# Patient Record
Sex: FEMALE | Race: WHITE | Employment: FULL TIME | URBAN - METROPOLITAN AREA
[De-identification: names, ages, dates, MRNs, and addresses within clinical notes are randomized per-mention and may not be internally consistent; named-entity substitution may affect disease eponyms.]

---

## 2018-12-12 ENCOUNTER — TRANSFERRED RECORDS (OUTPATIENT)
Dept: HEALTH INFORMATION MANAGEMENT | Facility: CLINIC | Age: 68
End: 2018-12-12

## 2018-12-13 ENCOUNTER — MEDICAL CORRESPONDENCE (OUTPATIENT)
Dept: HEALTH INFORMATION MANAGEMENT | Facility: CLINIC | Age: 68
End: 2018-12-13

## 2018-12-13 ENCOUNTER — TRANSFERRED RECORDS (OUTPATIENT)
Dept: HEALTH INFORMATION MANAGEMENT | Facility: CLINIC | Age: 68
End: 2018-12-13

## 2018-12-19 ENCOUNTER — TELEPHONE (OUTPATIENT)
Facility: CLINIC | Age: 68
End: 2018-12-19

## 2018-12-19 ENCOUNTER — OFFICE VISIT (OUTPATIENT)
Dept: NEUROSURGERY | Facility: CLINIC | Age: 68
End: 2018-12-19
Attending: NEUROLOGICAL SURGERY
Payer: COMMERCIAL

## 2018-12-19 VITALS
DIASTOLIC BLOOD PRESSURE: 85 MMHG | OXYGEN SATURATION: 95 % | HEART RATE: 78 BPM | SYSTOLIC BLOOD PRESSURE: 137 MMHG | HEIGHT: 62 IN | WEIGHT: 148 LBS | BODY MASS INDEX: 27.23 KG/M2 | TEMPERATURE: 98.8 F

## 2018-12-19 DIAGNOSIS — M47.12 CERVICAL SPONDYLOSIS WITH MYELOPATHY: Primary | ICD-10-CM

## 2018-12-19 DIAGNOSIS — G95.89 MYELOMALACIA (H): ICD-10-CM

## 2018-12-19 PROCEDURE — G0463 HOSPITAL OUTPT CLINIC VISIT: HCPCS

## 2018-12-19 PROCEDURE — 99204 OFFICE O/P NEW MOD 45 MIN: CPT | Performed by: NEUROLOGICAL SURGERY

## 2018-12-19 ASSESSMENT — MIFFLIN-ST. JEOR: SCORE: 1151.63

## 2018-12-19 ASSESSMENT — PAIN SCALES - GENERAL: PAINLEVEL: MODERATE PAIN (5)

## 2018-12-19 NOTE — NURSING NOTE
Pre-operative Education    Education included but not limited to:  - Pre-operative physical with primary care physician within 30 days of surgical date. Pre op [facility] clinic in [place] Dr Augustin Kam.    - Pre-operative clearance (cardiology, hematology, etc).   - Discontinue Aspirin, NSAIDs, Diclofenac x 7 days prior to surgical date.    -May try tylenol for pain 1000 mg three times per day for pain  -Do not begin taking Non-Steroidal Anti-Inflammatory Drugs or NSAIDs (Advil,Motrin, Ibuprofen,Nuprin, Diclofenac,Meloxicam, Aleve, Celebrex, Aspirin, etc.) until 12 weeks after surgery if you had a fusion. May cause bleeding and interfere with bone healing.  -Patient is not a smoker/  -Forms to be completed: FMLA/STD 6 weeks   -Surgical risks: blood clots in the leg or lung, problems urinating, nerve damage, drainage from the incision, infection,stiffness  -Preparation timeline  - When to start NPO           -Special bathing procedure.Patient received Hibiclens and showering instruction sheet.   Hospital stay:  Checking in  The surgery itself   Recovery room  - Transition to the hospital room where you will begin your recovery  - Managing pain   - 1 night hospitalization  - Post operative incisional pain x 1-2 weeks which will require pain medications and muscle relaxants.   -Do NOT drive or drink alcohol while taking narcotic pain medication.  -Post operative incision care-Keep your incision clean and dry at all times. OK to remove dressing on postop day 2. OK to shower on postop day 3 and allow water to run over incision, pat dry after shower. No bathing, swimming or submerging in water. Call immediately or come to ED if any drainage occurs, or you develop new pain. Watch for signs of infection: redness, swelling, warmth, drainage, and fever of 101 degrees or higher. Notify clinic.   - Post operative activity limitations: 6-8 weeks, no lifting > 10 pounds, no bending, twisting, or overhead reaching.  -Ok  to walk as tolerated, avoid bed rest and prolonged sitting.  -No contact sports until after follow up visit  -No high impact activities such as; running/jogging, snowmobile or 4 beckman riding or any other recreational vehicles.  -Orthotics will fit you for a brace in the hospital.Cervical fusion: wear soft collar for up to 2 weeks as needed for comfort.  - Post op follow up appointments:  6 weeks with Nurse Practitioner with X-ray prior. Please call to schedule follow up appointment at [phone].   - Spine Education book was also given to the patient for further review.      Patient verbalized understanding of above instructions. All questions were answered to the best of my ability and the patient's satisfaction. Patient advised to call with any additional questions or concerns.  Georgiana Tillman RN

## 2018-12-19 NOTE — NURSING NOTE
"Gay L Gerhardt is a 68 year old female who presents for:  Chief Complaint   Patient presents with     Neurologic Problem     Cervical myeopathy with cervical disc disease with myeopathy        Vitals:    Vitals:    12/19/18 1147   BP: 137/85   BP Location: Right arm   Patient Position: Sitting   Cuff Size: Adult Regular   Pulse: 78   Temp: 98.8  F (37.1  C)   TempSrc: Oral   SpO2: 95%   Weight: 148 lb (67.1 kg)   Height: 5' 1.5\" (1.562 m)       BMI:  Estimated body mass index is 27.51 kg/m  as calculated from the following:    Height as of this encounter: 5' 1.5\" (1.562 m).    Weight as of this encounter: 148 lb (67.1 kg).    Pain Score:  Moderate Pain (5)      Do you feel safe in your environment?  Yes      Leena Willis          "

## 2018-12-19 NOTE — PROGRESS NOTES
Neurosurgery Spine Consult [facility]      CC: Cervical myelopathy    Primary care Provider: System, Provider Not In    I was asked to see the patient by:  Leonie Argueta MD  [facility]  [address] [facility] [address]  [place] [address]      [place]: Gay L Gerhardt is a 68 year old female that presents to clinic with a complaint of numbness and tingling in the bilateral upper and bilateral lower extremities with difficulty walking.  The patient describes having progressive instability and weakness and difficulty with fine motor bilateral upper extremity numbness and tingling in bilateral lower extremity numbness and tingling.  She has not had therapy or injections and would not benefit from either.  She has difficulty with picking up small objects and ambulating and feels that this is progressively worsened over a period of time.  Her bowel and bladder function is intact.      Past Medical History:   Diagnosis Date     Anxiety state, unspecified      Depressive disorder, not elsewhere classified      Osteoporosis, unspecified      Postconcussion syndrome 2002    headaches - MCON       Past Surgical History:   Procedure Laterality Date     HYSTERECTOMY, ERICA      Hysterectomy,       Current Outpatient Medications   Medication     ALPRAZOLAM 0.5 MG OR TABS     ALPRAZOLAM 0.5 MG OR TABS     ASPIRIN 325 MG OR TBEC     BUSPIRONE HCL 5 MG OR TABS     BUSPIRONE HCL 5 MG OR TABS     CALCIUM 600 600 MG OR TABS     FOSAMAX 70 MG OR TABS     LIPITOR 10 MG OR TABS     LISINOPRIL 10 MG OR TABS     MULTIVITAMINS OR TABS     NAPROXEN 500 MG OR TABS     VITAMIN C 500 MG OR TABS     VITAMIN E 400 UNIT OR CAPS     No current facility-administered medications for this visit.        Allergies   Allergen Reactions     Paxil [Paroxetine] Fatigue     Pt had hallucinations and very negative, destructive feelings.        Social History     Socioeconomic History     Marital status:       Spouse name: None     Number of children: None     Years of education: None     Highest education level: None   Social Needs     Financial resource strain: None     Food insecurity - worry: None     Food insecurity - inability: None     Transportation needs - medical: None     Transportation needs - non-medical: None   Occupational History     None   Tobacco Use     Smoking status: Passive Smoke Exposure - Never Smoker     Smokeless tobacco: Never Used   Substance and Sexual Activity     Alcohol use: No     Drug use: No     Sexual activity: None   Other Topics Concern     Parent/sibling w/ CABG, MI or angioplasty before 65F 55M? Not Asked   Social History Narrative     None       Family History   Problem Relation Age of Onset     Thyroid Disease Mother         born 192     Cancer Father          40yo Leukemia     Family History Negative Sister      Family History Negative Sister      Alcohol/Drug Sister         alcoholic and smoker     Family History Negative Daughter      Family History Negative Son      Heart Disease Maternal Grandmother          59yo          Review Of Systems  Skin: negative  Eyes: negative  Ears/Nose/Throat: negative  Respiratory: No shortness of breath, dyspnea on exertion, cough, or hemoptysis  Cardiovascular: negative  Gastrointestinal: negative  Genitourinary: negative  Musculoskeletal: as above and neck pain  Neurologic: as above  Psychiatric: negative  Hematologic/Lymphatic/Immunologic: negative  Endocrine: negative    B/P: 137/85, T: 98.8, P: 78, R: Data Unavailable    Examination:  Normal affect and mood  No obvious labored respiration  No skin lesions noted   No obvious pitting edema  No abnormal psychiatric behavior  No obvious musculoskeletal abnormalities   Awake  Alert  Oriented x 3  Speech clear  Cranial nerves II - XII intact  Face symmetric  Tongue midline  Neck nontender  Normal ROM of neck  Motor exam    RUE - deltoid 5/5, biceps 4/5, triceps 4/5, wrist  extension 4/5, wrist flexion 4/5,  4+/5   LUE - deltoid 5/5, biceps 5/5, triceps 5/5, wrist extension 5/5, wrist flexion 5/5,  5/5     Sensation decreased in BUE and BLE  Clonus 2-3 beats  DTR 3+ throughout the patella tendon  Smith's sign positive  Spurling's sign positive  Ambulation slightly unstable    Imaging:   MRI cervical spine reveals C4-5 moderately severe stenosis, C5-6 severe stenosis and C6-7 various and severe stenosis with cord compression and myelomalacia        Diagnosis:  C8-year-old female with severe cervical stenosis from C4-7 with myelopathy and myelomalacia      Assessment/Plan:   I recommended we proceed with a C4-7 anterior cervical discectomy and fusion the first week of January.I discussed with the patient the risk of surgery to include, but, not be limited to; dysphagia, hoarseness, paralyzed vocal cord, nerve/spinal cord injury, pseudoarthrosis, failure of hardware, failure of improvement of symptoms, CSF leak,  infection, post op hematoma, the need for recurrent surgery, paralysis, coma and death        Adalid Bagley MD, MS, FAANS  Neurosurgeon  [facility]  [facility]  [address], [address]  [place] [address]  [phone]

## 2018-12-19 NOTE — LETTER
[facility]   [facility]  [address] [place] [address]        December 19, 2018          To Whom it May Concern,      Gay L Gerhardt was seen in clinic today 12/19/18. She will be scheduled for a cervical spinal fusion. She will need to be off of work for 6 weeks following her surgery for post op recovery and healing.         Sincerely,            Adalid Bagley MD  [facility]  [address] [address]    Tel [phone]

## 2018-12-19 NOTE — PATIENT INSTRUCTIONS
- Work letter given.    Patient Instructions  Pre-Operative:  -Surgery scheduled at [facility] for C4-7 ACDF (anterior cervical discectomy and fusion)  -Surgical risks: blood clots in the leg or lung, problems urinating, nerve damage, drainage from the incision, infection,stiffness  - Pre-operative physical with primary care physician within 30 days of surgical date.   -1 night hospitalization.    -Shower procedure: please shower with antimicrobial soap the night before surgery and morning of surgery. Please refer to showering instruction sheet in folder.  -Stop all solid foods 8 hours before surgery.  -Keep drinking clear liquids until 4 hours before surgery. Clear liquids include water, clear juice, black coffee, or clear tea without milk, Gatorade, clear soda.   - Discontinue Aspirin, NSAIDs (Advil/Ibuprofen, Naproxen,Nuprin,Relafen/Nabumetone, Diclofenac,Meloxicam, Aleve, Celebrex) x 7 days prior to surgical date.    - May try tylenol for pain 1000 mg three times per day for pain        Post-Operative:  -Do not begin taking Non-Steroidal Anti-Inflammatory Drugs or NSAIDs (Advil/ibuprofen, Naproxen,Nuprin, Relafen/Nabumetone, Diclofenac,Meloxicam, Aleve, Celebrex, Aspirin, etc.) until 12 weeks after surgery. May cause bleeding and interfere with bone healing.   - Post operative incisional pain x 1-2 weeks which will require pain medications and muscle relaxants. You will receive medication upon discharge.  -Do NOT drive while taking narcotic pain medication.  -Do not drink alcohol while using any pain medication   -Post operative incision care- Watch for signs of infection: redness, swelling, warmth, drainage, and fever of 101 degrees or higher. [facility] [phone].  -No submerging incision in water such as pools, hot tubs,baths for at least 8 weeks or until incision is healed. Showers are fine.   - Post operative activity limitations: 6-8 weeks, no lifting > 10 pounds, no bending, twisting,  or overhead reaching.  -Ok to walk as tolerated, avoid bed rest and prolonged sitting.  -No contact sports until after follow up visit  -No high impact activities such as; running/jogging, snowmobile or 4 beckman riding or any other recreational vehicles.  -Orthotics will fit you for a brace in the hospital.Cervical fusion: wear soft collar for up to 2 weeks as needed for comfort.  - Post op follow up appointments: 6 week post op follow up visit with Nurse practitioner and x-ray prior to appointment.Please call to schedule follow up appointment at [phone].  -If you are currently employed, you will need to be off work for 4-6 weeks for post op recovery and healing.

## 2018-12-20 RX ORDER — ESCITALOPRAM OXALATE 10 MG/1
10 TABLET ORAL AT BEDTIME
COMMUNITY

## 2018-12-20 RX ORDER — PRAMIPEXOLE DIHYDROCHLORIDE 0.25 MG/1
0.25 TABLET ORAL AT BEDTIME
COMMUNITY

## 2018-12-20 RX ORDER — SIMVASTATIN 20 MG
20 TABLET ORAL AT BEDTIME
COMMUNITY

## 2018-12-21 ENCOUNTER — ANESTHESIA EVENT (OUTPATIENT)
Dept: SURGERY | Facility: CLINIC | Age: 68
DRG: 472 | End: 2018-12-21
Payer: COMMERCIAL

## 2018-12-26 ENCOUNTER — DOCUMENTATION ONLY (OUTPATIENT)
Dept: NEUROSURGERY | Facility: CLINIC | Age: 68
End: 2018-12-26

## 2018-12-26 NOTE — PROGRESS NOTES
Patient called today and said that she is having a lot of pain which is interfering with her job.     We will take her off of work from 12/24/18 through her surgery date 1/4/19 then for 6 weeks after for post op healing and recovery.     Work letter faxed to ATTN: Atiya Christine [phone] on 12/26/18.

## 2018-12-26 NOTE — LETTER
[facility]   [facility]  [address] [place] [address]        December 26, 2018          To Whom it May Concern,    Please excuse Gay L Gerhardt from work 12/24/2018 through date of surgery 1/4/2019, and then for 6 weeks following surgery for post op recovery and healing.             Sincerely,            Adalid Bagley MD  [facility]  [address] [place] [address]    Tel [phone]

## 2018-12-30 NOTE — PHARMACY-ADMISSION MEDICATION HISTORY
Medication reconciliation interview completed by pre-admitting nurse Shawna Islas, reviewed by pharmacy. No further clarifications needed.       Prior to Admission medications    Medication Sig Last Dose Taking? Auth Provider   cholecalciferol (D3 HIGH POTENCY) 2000 units CAPS Take 4,000 Units by mouth daily  Yes Reported, Patient   escitalopram (LEXAPRO) 10 MG tablet Take 10 mg by mouth At Bedtime  Yes Reported, Patient   FOSAMAX 70 MG OR TABS ONE TABLET WEEKLY  Yes Bridgett Delgado MD   NONFORMULARY Take 1,000 mg by mouth daily Calcium  Yes Reported, Patient   pramipexole (MIRAPEX) 0.25 MG tablet Take 0.25 mg by mouth At Bedtime  Yes Reported, Patient   simvastatin (ZOCOR) 20 MG tablet Take 20 mg by mouth At Bedtime  Yes Reported, Patient   VITAMIN E 400 UNIT OR CAPS ONE CAPSULE DAILY  Yes Bridgett Delgado MD   ASPIRIN 325 MG OR TBEC ONE DAILY   Bridgett Delgado MD   MULTIVITAMINS OR TABS ONE DAILY   Bridgett Delgado MD            Please follow up with Dr. Nieves in 1 week  [phone]        SPECIAL CAUTION FOR GROIN PUNCTURE:    1. No driving, bending over at the waist, or bearing down for 48 hours.    2. No lifting over 5 lbs for 1 week.    3. No bath or swimming pools for 1 week, you may shower. No lotions or powders    4. If Bleeding occurs, lie down immediately and apply pressure to the site with your fingers. Have Someone call you physician immediately. Call 911 if bleeding does not stop.    5. If you have any questions or problems of medical nature your should call your physician.    6. Call for fever >100.4, abnormal bruising or drainage from the site.    7. You may remove the dressing in the morning, no need to apply a bandage.

## 2019-01-02 ENCOUNTER — DOCUMENTATION ONLY (OUTPATIENT)
Dept: NEUROSURGERY | Facility: CLINIC | Age: 69
End: 2019-01-02

## 2019-01-02 NOTE — PROGRESS NOTES
1/2/2019    [employer] Forms: yes    Faxed to: [phone]     Type of form: Certification of health care provider for employees serious health condition (family and medical leave act)    Placed a copy in the bin and sent the original to medical records       Principal Discharge DX:	Jacques catheter problem, subsequent encounter

## 2019-01-04 ENCOUNTER — HOSPITAL ENCOUNTER (INPATIENT)
Facility: CLINIC | Age: 69
LOS: 1 days | Discharge: HOME | DRG: 472 | End: 2019-01-05
Attending: NEUROLOGICAL SURGERY | Admitting: NEUROLOGICAL SURGERY
Payer: COMMERCIAL

## 2019-01-04 ENCOUNTER — APPOINTMENT (OUTPATIENT)
Dept: GENERAL RADIOLOGY | Facility: CLINIC | Age: 69
DRG: 472 | End: 2019-01-04
Attending: NEUROLOGICAL SURGERY
Payer: COMMERCIAL

## 2019-01-04 ENCOUNTER — ANESTHESIA (OUTPATIENT)
Dept: SURGERY | Facility: CLINIC | Age: 69
DRG: 472 | End: 2019-01-04
Payer: COMMERCIAL

## 2019-01-04 DIAGNOSIS — Z98.1 S/P CERVICAL SPINAL FUSION: ICD-10-CM

## 2019-01-04 LAB
ABO + RH BLD: NORMAL
ABO + RH BLD: NORMAL
BLD GP AB SCN SERPL QL: NORMAL
BLOOD BANK CMNT PATIENT-IMP: NORMAL
GLUCOSE BLDC GLUCOMTR-MCNC: 141 MG/DL (ref 70–99)
HGB BLD-MCNC: 12.6 G/DL (ref 11.7–15.7)
SPECIMEN EXP DATE BLD: NORMAL

## 2019-01-04 PROCEDURE — 40000277 XR SURGERY CARM FLUORO LESS THAN 5 MIN W STILLS: Mod: TC

## 2019-01-04 PROCEDURE — 85018 HEMOGLOBIN: CPT | Performed by: NEUROLOGICAL SURGERY

## 2019-01-04 PROCEDURE — 37000009 ZZH ANESTHESIA TECHNICAL FEE, EACH ADDTL 15 MIN: Performed by: NEUROLOGICAL SURGERY

## 2019-01-04 PROCEDURE — 36000071 ZZH SURGERY LEVEL 5 W FLUORO 1ST 30 MIN: Performed by: NEUROLOGICAL SURGERY

## 2019-01-04 PROCEDURE — 25000125 ZZHC RX 250: Performed by: NURSE ANESTHETIST, CERTIFIED REGISTERED

## 2019-01-04 PROCEDURE — 86850 RBC ANTIBODY SCREEN: CPT | Performed by: NEUROLOGICAL SURGERY

## 2019-01-04 PROCEDURE — C1762 CONN TISS, HUMAN(INC FASCIA): HCPCS | Performed by: NEUROLOGICAL SURGERY

## 2019-01-04 PROCEDURE — 25000128 H RX IP 250 OP 636: Performed by: NEUROLOGICAL SURGERY

## 2019-01-04 PROCEDURE — 25000128 H RX IP 250 OP 636: Performed by: ANESTHESIOLOGY

## 2019-01-04 PROCEDURE — 25000125 ZZHC RX 250: Performed by: NEUROLOGICAL SURGERY

## 2019-01-04 PROCEDURE — 00000146 ZZHCL STATISTIC GLUCOSE BY METER IP

## 2019-01-04 PROCEDURE — 0RB30ZZ EXCISION OF CERVICAL VERTEBRAL DISC, OPEN APPROACH: ICD-10-PCS | Performed by: NEUROLOGICAL SURGERY

## 2019-01-04 PROCEDURE — 4A11X4G MONITORING OF PERIPHERAL NERVOUS ELECTRICAL ACTIVITY, INTRAOPERATIVE, EXTERNAL APPROACH: ICD-10-PCS | Performed by: NEUROLOGICAL SURGERY

## 2019-01-04 PROCEDURE — 12000000 ZZH R&B MED SURG/OB

## 2019-01-04 PROCEDURE — 22551 ARTHRD ANT NTRBDY CERVICAL: CPT | Performed by: NEUROLOGICAL SURGERY

## 2019-01-04 PROCEDURE — 0RG20A0 FUSION OF 2 OR MORE CERVICAL VERTEBRAL JOINTS WITH INTERBODY FUSION DEVICE, ANTERIOR APPROACH, ANTERIOR COLUMN, OPEN APPROACH: ICD-10-PCS | Performed by: NEUROLOGICAL SURGERY

## 2019-01-04 PROCEDURE — 22552 ARTHRD ANT NTRBD CERVICAL EA: CPT | Performed by: NEUROLOGICAL SURGERY

## 2019-01-04 PROCEDURE — 25000132 ZZH RX MED GY IP 250 OP 250 PS 637: Performed by: NEUROLOGICAL SURGERY

## 2019-01-04 PROCEDURE — 22846 INSERT SPINE FIXATION DEVICE: CPT | Mod: 59 | Performed by: NEUROLOGICAL SURGERY

## 2019-01-04 PROCEDURE — 86901 BLOOD TYPING SEROLOGIC RH(D): CPT | Performed by: NEUROLOGICAL SURGERY

## 2019-01-04 PROCEDURE — G0378 HOSPITAL OBSERVATION PER HR: HCPCS

## 2019-01-04 PROCEDURE — 27211022 ZZHC OR IOM SUPPLIES OPNP: Performed by: NEUROLOGICAL SURGERY

## 2019-01-04 PROCEDURE — 95940 IONM IN OPERATNG ROOM 15 MIN: CPT | Performed by: NEUROLOGICAL SURGERY

## 2019-01-04 PROCEDURE — 27210794 ZZH OR GENERAL SUPPLY STERILE: Performed by: NEUROLOGICAL SURGERY

## 2019-01-04 PROCEDURE — 00NW0ZZ RELEASE CERVICAL SPINAL CORD, OPEN APPROACH: ICD-10-PCS | Performed by: NEUROLOGICAL SURGERY

## 2019-01-04 PROCEDURE — 22853 INSJ BIOMECHANICAL DEVICE: CPT | Performed by: NEUROLOGICAL SURGERY

## 2019-01-04 PROCEDURE — 86900 BLOOD TYPING SEROLOGIC ABO: CPT | Performed by: NEUROLOGICAL SURGERY

## 2019-01-04 PROCEDURE — 36415 COLL VENOUS BLD VENIPUNCTURE: CPT | Performed by: NEUROLOGICAL SURGERY

## 2019-01-04 PROCEDURE — 36000069 ZZH SURGERY LEVEL 5 EA 15 ADDTL MIN: Performed by: NEUROLOGICAL SURGERY

## 2019-01-04 PROCEDURE — 40000306 ZZH STATISTIC PRE PROC ASSESS II: Performed by: NEUROLOGICAL SURGERY

## 2019-01-04 PROCEDURE — 71000012 ZZH RECOVERY PHASE 1 LEVEL 1 FIRST HR: Performed by: NEUROLOGICAL SURGERY

## 2019-01-04 PROCEDURE — 25000128 H RX IP 250 OP 636: Performed by: NURSE ANESTHETIST, CERTIFIED REGISTERED

## 2019-01-04 PROCEDURE — 37000008 ZZH ANESTHESIA TECHNICAL FEE, 1ST 30 MIN: Performed by: NEUROLOGICAL SURGERY

## 2019-01-04 PROCEDURE — 71000013 ZZH RECOVERY PHASE 1 LEVEL 1 EA ADDTL HR: Performed by: NEUROLOGICAL SURGERY

## 2019-01-04 PROCEDURE — 25000300 ZZH OR RX SURGIFLO HEMOSTATIC MATRIX 10ML 199102S OPNP: Performed by: NEUROLOGICAL SURGERY

## 2019-01-04 DEVICE — IMPLANTABLE DEVICE: Type: IMPLANTABLE DEVICE | Site: SPINE CERVICAL | Status: FUNCTIONAL

## 2019-01-04 DEVICE — GRAFT BONE PUTTY DBX 01ML 038010: Type: IMPLANTABLE DEVICE | Site: SPINE CERVICAL | Status: FUNCTIONAL

## 2019-01-04 RX ORDER — KETAMINE HYDROCHLORIDE 10 MG/ML
INJECTION INTRAMUSCULAR; INTRAVENOUS PRN
Status: DISCONTINUED | OUTPATIENT
Start: 2019-01-04 | End: 2019-01-04

## 2019-01-04 RX ORDER — METOCLOPRAMIDE HYDROCHLORIDE 5 MG/ML
5 INJECTION INTRAMUSCULAR; INTRAVENOUS EVERY 6 HOURS PRN
Status: DISCONTINUED | OUTPATIENT
Start: 2019-01-04 | End: 2019-01-05 | Stop reason: HOSPADM

## 2019-01-04 RX ORDER — FENTANYL CITRATE 50 UG/ML
25-50 INJECTION, SOLUTION INTRAMUSCULAR; INTRAVENOUS
Status: DISCONTINUED | OUTPATIENT
Start: 2019-01-04 | End: 2019-01-04 | Stop reason: HOSPADM

## 2019-01-04 RX ORDER — SODIUM CHLORIDE AND POTASSIUM CHLORIDE 150; 900 MG/100ML; MG/100ML
INJECTION, SOLUTION INTRAVENOUS CONTINUOUS
Status: DISCONTINUED | OUTPATIENT
Start: 2019-01-04 | End: 2019-01-05 | Stop reason: HOSPADM

## 2019-01-04 RX ORDER — ONDANSETRON 2 MG/ML
4 INJECTION INTRAMUSCULAR; INTRAVENOUS EVERY 6 HOURS PRN
Status: DISCONTINUED | OUTPATIENT
Start: 2019-01-04 | End: 2019-01-05 | Stop reason: HOSPADM

## 2019-01-04 RX ORDER — DEXAMETHASONE SODIUM PHOSPHATE 4 MG/ML
INJECTION, SOLUTION INTRA-ARTICULAR; INTRALESIONAL; INTRAMUSCULAR; INTRAVENOUS; SOFT TISSUE PRN
Status: DISCONTINUED | OUTPATIENT
Start: 2019-01-04 | End: 2019-01-04

## 2019-01-04 RX ORDER — ACETAMINOPHEN 325 MG/1
975 TABLET ORAL EVERY 8 HOURS
Status: DISCONTINUED | OUTPATIENT
Start: 2019-01-04 | End: 2019-01-05 | Stop reason: HOSPADM

## 2019-01-04 RX ORDER — CEFAZOLIN SODIUM 1 G/3ML
1 INJECTION, POWDER, FOR SOLUTION INTRAMUSCULAR; INTRAVENOUS SEE ADMIN INSTRUCTIONS
Status: DISCONTINUED | OUTPATIENT
Start: 2019-01-04 | End: 2019-01-04 | Stop reason: HOSPADM

## 2019-01-04 RX ORDER — NALOXONE HYDROCHLORIDE 0.4 MG/ML
.1-.4 INJECTION, SOLUTION INTRAMUSCULAR; INTRAVENOUS; SUBCUTANEOUS
Status: DISCONTINUED | OUTPATIENT
Start: 2019-01-04 | End: 2019-01-05 | Stop reason: HOSPADM

## 2019-01-04 RX ORDER — PRAMIPEXOLE DIHYDROCHLORIDE 0.12 MG/1
0.25 TABLET ORAL AT BEDTIME
Status: DISCONTINUED | OUTPATIENT
Start: 2019-01-04 | End: 2019-01-05 | Stop reason: HOSPADM

## 2019-01-04 RX ORDER — DEXAMETHASONE SODIUM PHOSPHATE 4 MG/ML
4 INJECTION, SOLUTION INTRA-ARTICULAR; INTRALESIONAL; INTRAMUSCULAR; INTRAVENOUS; SOFT TISSUE EVERY 6 HOURS
Status: COMPLETED | OUTPATIENT
Start: 2019-01-04 | End: 2019-01-05

## 2019-01-04 RX ORDER — METOCLOPRAMIDE 5 MG/1
5 TABLET ORAL EVERY 6 HOURS PRN
Status: DISCONTINUED | OUTPATIENT
Start: 2019-01-04 | End: 2019-01-05 | Stop reason: HOSPADM

## 2019-01-04 RX ORDER — METHOCARBAMOL 750 MG/1
750 TABLET, FILM COATED ORAL EVERY 6 HOURS PRN
Status: DISCONTINUED | OUTPATIENT
Start: 2019-01-04 | End: 2019-01-05 | Stop reason: HOSPADM

## 2019-01-04 RX ORDER — ACETAMINOPHEN 325 MG/1
975 TABLET ORAL EVERY 8 HOURS
Status: DISCONTINUED | OUTPATIENT
Start: 2019-01-04 | End: 2019-01-04

## 2019-01-04 RX ORDER — LIDOCAINE HYDROCHLORIDE 10 MG/ML
INJECTION, SOLUTION INFILTRATION; PERINEURAL PRN
Status: DISCONTINUED | OUTPATIENT
Start: 2019-01-04 | End: 2019-01-04

## 2019-01-04 RX ORDER — SODIUM CHLORIDE, SODIUM LACTATE, POTASSIUM CHLORIDE, CALCIUM CHLORIDE 600; 310; 30; 20 MG/100ML; MG/100ML; MG/100ML; MG/100ML
INJECTION, SOLUTION INTRAVENOUS CONTINUOUS
Status: DISCONTINUED | OUTPATIENT
Start: 2019-01-04 | End: 2019-01-04 | Stop reason: HOSPADM

## 2019-01-04 RX ORDER — ONDANSETRON 4 MG/1
4 TABLET, ORALLY DISINTEGRATING ORAL EVERY 6 HOURS PRN
Status: DISCONTINUED | OUTPATIENT
Start: 2019-01-04 | End: 2019-01-05 | Stop reason: HOSPADM

## 2019-01-04 RX ORDER — SIMVASTATIN 20 MG
20 TABLET ORAL AT BEDTIME
Status: DISCONTINUED | OUTPATIENT
Start: 2019-01-04 | End: 2019-01-05 | Stop reason: HOSPADM

## 2019-01-04 RX ORDER — PROCHLORPERAZINE MALEATE 5 MG
5 TABLET ORAL EVERY 6 HOURS PRN
Status: DISCONTINUED | OUTPATIENT
Start: 2019-01-04 | End: 2019-01-05 | Stop reason: HOSPADM

## 2019-01-04 RX ORDER — EPHEDRINE SULFATE 50 MG/ML
INJECTION, SOLUTION INTRAMUSCULAR; INTRAVENOUS; SUBCUTANEOUS PRN
Status: DISCONTINUED | OUTPATIENT
Start: 2019-01-04 | End: 2019-01-04

## 2019-01-04 RX ORDER — BUPIVACAINE HYDROCHLORIDE AND EPINEPHRINE 5; 5 MG/ML; UG/ML
INJECTION, SOLUTION PERINEURAL PRN
Status: DISCONTINUED | OUTPATIENT
Start: 2019-01-04 | End: 2019-01-04 | Stop reason: HOSPADM

## 2019-01-04 RX ORDER — FENTANYL CITRATE 50 UG/ML
INJECTION, SOLUTION INTRAMUSCULAR; INTRAVENOUS PRN
Status: DISCONTINUED | OUTPATIENT
Start: 2019-01-04 | End: 2019-01-04

## 2019-01-04 RX ORDER — CEFAZOLIN SODIUM 2 G/100ML
2 INJECTION, SOLUTION INTRAVENOUS EVERY 8 HOURS
Status: DISCONTINUED | OUTPATIENT
Start: 2019-01-04 | End: 2019-01-05 | Stop reason: HOSPADM

## 2019-01-04 RX ORDER — HYDROMORPHONE HYDROCHLORIDE 1 MG/ML
.3-.5 INJECTION, SOLUTION INTRAMUSCULAR; INTRAVENOUS; SUBCUTANEOUS EVERY 5 MIN PRN
Status: DISCONTINUED | OUTPATIENT
Start: 2019-01-04 | End: 2019-01-04 | Stop reason: HOSPADM

## 2019-01-04 RX ORDER — LIDOCAINE 40 MG/G
CREAM TOPICAL
Status: DISCONTINUED | OUTPATIENT
Start: 2019-01-04 | End: 2019-01-04 | Stop reason: HOSPADM

## 2019-01-04 RX ORDER — DIPHENHYDRAMINE HCL 12.5MG/5ML
12.5 LIQUID (ML) ORAL EVERY 6 HOURS PRN
Status: DISCONTINUED | OUTPATIENT
Start: 2019-01-04 | End: 2019-01-05 | Stop reason: HOSPADM

## 2019-01-04 RX ORDER — DOCUSATE SODIUM 100 MG/1
100 CAPSULE, LIQUID FILLED ORAL 2 TIMES DAILY
Status: DISCONTINUED | OUTPATIENT
Start: 2019-01-04 | End: 2019-01-05 | Stop reason: HOSPADM

## 2019-01-04 RX ORDER — ONDANSETRON 2 MG/ML
INJECTION INTRAMUSCULAR; INTRAVENOUS PRN
Status: DISCONTINUED | OUTPATIENT
Start: 2019-01-04 | End: 2019-01-04

## 2019-01-04 RX ORDER — ACETAMINOPHEN 325 MG/1
650 TABLET ORAL EVERY 4 HOURS PRN
Status: DISCONTINUED | OUTPATIENT
Start: 2019-01-07 | End: 2019-01-05 | Stop reason: HOSPADM

## 2019-01-04 RX ORDER — HYDROMORPHONE HYDROCHLORIDE 2 MG/1
2-4 TABLET ORAL
Status: DISCONTINUED | OUTPATIENT
Start: 2019-01-04 | End: 2019-01-04

## 2019-01-04 RX ORDER — CEFAZOLIN SODIUM 2 G/100ML
2 INJECTION, SOLUTION INTRAVENOUS
Status: COMPLETED | OUTPATIENT
Start: 2019-01-04 | End: 2019-01-04

## 2019-01-04 RX ORDER — ESCITALOPRAM OXALATE 10 MG/1
10 TABLET ORAL AT BEDTIME
Status: DISCONTINUED | OUTPATIENT
Start: 2019-01-04 | End: 2019-01-05 | Stop reason: HOSPADM

## 2019-01-04 RX ORDER — PROPOFOL 10 MG/ML
INJECTION, EMULSION INTRAVENOUS PRN
Status: DISCONTINUED | OUTPATIENT
Start: 2019-01-04 | End: 2019-01-04

## 2019-01-04 RX ORDER — GLYCOPYRROLATE 0.2 MG/ML
INJECTION, SOLUTION INTRAMUSCULAR; INTRAVENOUS PRN
Status: DISCONTINUED | OUTPATIENT
Start: 2019-01-04 | End: 2019-01-04

## 2019-01-04 RX ORDER — ONDANSETRON 4 MG/1
4 TABLET, ORALLY DISINTEGRATING ORAL EVERY 30 MIN PRN
Status: DISCONTINUED | OUTPATIENT
Start: 2019-01-04 | End: 2019-01-04 | Stop reason: HOSPADM

## 2019-01-04 RX ORDER — HYDROMORPHONE HYDROCHLORIDE 1 MG/ML
.3-.5 INJECTION, SOLUTION INTRAMUSCULAR; INTRAVENOUS; SUBCUTANEOUS
Status: DISCONTINUED | OUTPATIENT
Start: 2019-01-04 | End: 2019-01-05 | Stop reason: HOSPADM

## 2019-01-04 RX ORDER — OXYCODONE HYDROCHLORIDE 5 MG/1
5-10 TABLET ORAL EVERY 4 HOURS PRN
Status: DISCONTINUED | OUTPATIENT
Start: 2019-01-04 | End: 2019-01-05 | Stop reason: HOSPADM

## 2019-01-04 RX ORDER — NALOXONE HYDROCHLORIDE 0.4 MG/ML
.1-.4 INJECTION, SOLUTION INTRAMUSCULAR; INTRAVENOUS; SUBCUTANEOUS
Status: DISCONTINUED | OUTPATIENT
Start: 2019-01-04 | End: 2019-01-04

## 2019-01-04 RX ORDER — LIDOCAINE 40 MG/G
CREAM TOPICAL
Status: DISCONTINUED | OUTPATIENT
Start: 2019-01-04 | End: 2019-01-05 | Stop reason: HOSPADM

## 2019-01-04 RX ORDER — ONDANSETRON 2 MG/ML
4 INJECTION INTRAMUSCULAR; INTRAVENOUS EVERY 30 MIN PRN
Status: DISCONTINUED | OUTPATIENT
Start: 2019-01-04 | End: 2019-01-04 | Stop reason: HOSPADM

## 2019-01-04 RX ORDER — DIPHENHYDRAMINE HYDROCHLORIDE 50 MG/ML
12.5 INJECTION INTRAMUSCULAR; INTRAVENOUS EVERY 6 HOURS PRN
Status: DISCONTINUED | OUTPATIENT
Start: 2019-01-04 | End: 2019-01-05 | Stop reason: HOSPADM

## 2019-01-04 RX ORDER — ACETAMINOPHEN 325 MG/1
650 TABLET ORAL EVERY 4 HOURS PRN
Status: DISCONTINUED | OUTPATIENT
Start: 2019-01-07 | End: 2019-01-04

## 2019-01-04 RX ADMIN — PHENYLEPHRINE HYDROCHLORIDE 100 MCG: 10 INJECTION, SOLUTION INTRAMUSCULAR; INTRAVENOUS; SUBCUTANEOUS at 12:08

## 2019-01-04 RX ADMIN — PRAMIPEXOLE DIHYDROCHLORIDE 0.25 MG: 0.12 TABLET ORAL at 21:36

## 2019-01-04 RX ADMIN — ACETAMINOPHEN 975 MG: 325 TABLET, FILM COATED ORAL at 18:25

## 2019-01-04 RX ADMIN — LIDOCAINE HYDROCHLORIDE 30 MG: 10 INJECTION, SOLUTION INFILTRATION; PERINEURAL at 11:41

## 2019-01-04 RX ADMIN — FENTANYL CITRATE 50 MCG: 50 INJECTION, SOLUTION INTRAMUSCULAR; INTRAVENOUS at 15:01

## 2019-01-04 RX ADMIN — SODIUM CHLORIDE, POTASSIUM CHLORIDE, SODIUM LACTATE AND CALCIUM CHLORIDE: 600; 310; 30; 20 INJECTION, SOLUTION INTRAVENOUS at 11:04

## 2019-01-04 RX ADMIN — Medication 50 MG: at 11:56

## 2019-01-04 RX ADMIN — DEXAMETHASONE SODIUM PHOSPHATE 4 MG: 4 INJECTION, SOLUTION INTRAMUSCULAR; INTRAVENOUS at 18:25

## 2019-01-04 RX ADMIN — Medication 0.5 MG: at 19:42

## 2019-01-04 RX ADMIN — DEXMEDETOMIDINE HYDROCHLORIDE 20 MCG: 100 INJECTION, SOLUTION INTRAVENOUS at 12:15

## 2019-01-04 RX ADMIN — SODIUM CHLORIDE, POTASSIUM CHLORIDE, SODIUM LACTATE AND CALCIUM CHLORIDE: 600; 310; 30; 20 INJECTION, SOLUTION INTRAVENOUS at 12:00

## 2019-01-04 RX ADMIN — FENTANYL CITRATE 50 MCG: 50 INJECTION, SOLUTION INTRAMUSCULAR; INTRAVENOUS at 12:23

## 2019-01-04 RX ADMIN — POTASSIUM CHLORIDE AND SODIUM CHLORIDE: 900; 150 INJECTION, SOLUTION INTRAVENOUS at 18:25

## 2019-01-04 RX ADMIN — HYDROMORPHONE HYDROCHLORIDE 0.5 MG: 1 INJECTION, SOLUTION INTRAMUSCULAR; INTRAVENOUS; SUBCUTANEOUS at 12:32

## 2019-01-04 RX ADMIN — OXYCODONE HYDROCHLORIDE 10 MG: 5 TABLET ORAL at 22:50

## 2019-01-04 RX ADMIN — DEXAMETHASONE SODIUM PHOSPHATE 8 MG: 4 INJECTION, SOLUTION INTRA-ARTICULAR; INTRALESIONAL; INTRAMUSCULAR; INTRAVENOUS; SOFT TISSUE at 11:41

## 2019-01-04 RX ADMIN — CEFAZOLIN SODIUM 2 G: 2 INJECTION, SOLUTION INTRAVENOUS at 11:33

## 2019-01-04 RX ADMIN — ROCURONIUM BROMIDE 25 MG: 10 INJECTION INTRAVENOUS at 11:41

## 2019-01-04 RX ADMIN — PROPOFOL 140 MG: 10 INJECTION, EMULSION INTRAVENOUS at 11:41

## 2019-01-04 RX ADMIN — ONDANSETRON 4 MG: 2 INJECTION INTRAMUSCULAR; INTRAVENOUS at 13:40

## 2019-01-04 RX ADMIN — ESCITALOPRAM OXALATE 10 MG: 10 TABLET, FILM COATED ORAL at 21:37

## 2019-01-04 RX ADMIN — CEFAZOLIN SODIUM 2 G: 2 INJECTION, SOLUTION INTRAVENOUS at 18:28

## 2019-01-04 RX ADMIN — FENTANYL CITRATE 50 MCG: 50 INJECTION, SOLUTION INTRAMUSCULAR; INTRAVENOUS at 12:20

## 2019-01-04 RX ADMIN — FENTANYL CITRATE 50 MCG: 50 INJECTION, SOLUTION INTRAMUSCULAR; INTRAVENOUS at 14:35

## 2019-01-04 RX ADMIN — DOCUSATE SODIUM 100 MG: 100 CAPSULE, LIQUID FILLED ORAL at 19:42

## 2019-01-04 RX ADMIN — DEXMEDETOMIDINE HYDROCHLORIDE 0.5 MCG/KG/HR: 100 INJECTION, SOLUTION INTRAVENOUS at 11:53

## 2019-01-04 RX ADMIN — SIMVASTATIN 20 MG: 20 TABLET, FILM COATED ORAL at 21:37

## 2019-01-04 RX ADMIN — SODIUM CHLORIDE, POTASSIUM CHLORIDE, SODIUM LACTATE AND CALCIUM CHLORIDE: 600; 310; 30; 20 INJECTION, SOLUTION INTRAVENOUS at 13:27

## 2019-01-04 RX ADMIN — Medication 0.5 MG: at 15:51

## 2019-01-04 RX ADMIN — GLYCOPYRROLATE 0.2 MG: 0.2 INJECTION, SOLUTION INTRAMUSCULAR; INTRAVENOUS at 11:41

## 2019-01-04 RX ADMIN — MIDAZOLAM 2 MG: 1 INJECTION INTRAMUSCULAR; INTRAVENOUS at 11:33

## 2019-01-04 RX ADMIN — FENTANYL CITRATE 100 MCG: 50 INJECTION, SOLUTION INTRAMUSCULAR; INTRAVENOUS at 11:41

## 2019-01-04 RX ADMIN — Medication 5 MG: at 13:54

## 2019-01-04 RX ADMIN — HYDROMORPHONE HYDROCHLORIDE 0.5 MG: 1 INJECTION, SOLUTION INTRAMUSCULAR; INTRAVENOUS; SUBCUTANEOUS at 12:35

## 2019-01-04 RX ADMIN — OXYCODONE HYDROCHLORIDE 5 MG: 5 TABLET ORAL at 18:25

## 2019-01-04 ASSESSMENT — MIFFLIN-ST. JEOR: SCORE: 1174.25

## 2019-01-04 ASSESSMENT — ENCOUNTER SYMPTOMS: DYSRHYTHMIAS: 0

## 2019-01-04 NOTE — PROGRESS NOTES
Post op    Pt is doing well  Arms feel better and still has numbness in legs  Swallowing and voice are fine  Mobilize  Will need to ambulate with PT prior to discharge

## 2019-01-04 NOTE — ANESTHESIA PREPROCEDURE EVALUATION
Anesthesia Pre-Procedure Evaluation    Patient: Gay L Gerhardt   MRN: [id number] : 1950          Preoperative Diagnosis: severe stenosis with myelopathy    Procedure(s):  C4-7 Anterior Cervical Discectomy and fusion    Past Medical History:   Diagnosis Date     Anxiety state, unspecified      Depressive disorder, not elsewhere classified      Hyperlipidemia      Neck pain     & radiates down either left arm and leg or right arm and leg (alternates).  Also, has occas numbness & tingling down extrmities.     Osteoporosis, unspecified      PONV (postoperative nausea and vomiting)      Postconcussion syndrome     headaches - MCON     Restless leg syndrome      Past Surgical History:   Procedure Laterality Date     APPENDECTOMY       BACK SURGERY  2008    fusion- low back     HYSTERECTOMY, ERICA      Hysterectomy,     Anesthesia Evaluation     .             ROS/MED HX    ENT/Pulmonary:      (-) asthma and sleep apnea   Neurologic:     (+)neuropathy - Myelopathy,     Cardiovascular:        (-) hypertension, CAD, arrhythmias and dyslipidemia   METS/Exercise Tolerance:     Hematologic:        (-) anemia   Musculoskeletal:   (+) , , other musculoskeletal-      Upper extremity injury: Severe cedrvical stenosis.   GI/Hepatic:        (-) GERD and hepatitis   Renal/Genitourinary:      (-) renal disease   Endo:      (-) Type I DM, Type II DM, thyroid disease, chronic steroid usage, other endocrine disorder and obesity   Psychiatric:     (+) psychiatric history anxiety      Infectious Disease:  - neg infectious disease ROS       Malignancy:      - no malignancy   Other:                          Physical Exam      Airway   Mallampati: II  TM distance: >3 FB  Neck ROM: full    Dental     Cardiovascular   Rhythm and rate: regular and normal  (+) murmur       Pulmonary    breath sounds clear to auscultation    Other findings: No lab results found.   No lab results found.        Lab Results   Component Value Date    WBC 6.2  "02/13/2007    HGB 10.5 (L) 06/04/2008    HCT 39.7 02/13/2007     02/13/2007    SED 7 02/13/2007     02/07/2007    POTASSIUM 4.5 02/07/2007    CHLORIDE 103 02/07/2007    CO2 24 02/07/2007    BUN 7 02/07/2007    CR 0.90 02/07/2007    GLC 91 02/07/2007    ABNER 10.2 02/07/2007    TSH 1.82 02/07/2007       Preop Vitals  BP Readings from Last 3 Encounters:   01/04/19 146/78   12/19/18 137/85   02/13/07 106/58    Pulse Readings from Last 3 Encounters:   12/19/18 78   02/13/07 82   02/07/07 88      Resp Readings from Last 3 Encounters:   01/04/19 14   02/13/07 20    SpO2 Readings from Last 3 Encounters:   01/04/19 98%   12/19/18 95%      Temp Readings from Last 1 Encounters:   01/04/19 98.1  F (36.7  C) (Temporal)    Ht Readings from Last 1 Encounters:   01/04/19 1.562 m (5' 1.5\")      Wt Readings from Last 1 Encounters:   01/04/19 69.4 kg (153 lb)    Estimated body mass index is 28.44 kg/m  as calculated from the following:    Height as of this encounter: 1.562 m (5' 1.5\").    Weight as of this encounter: 69.4 kg (153 lb).       Anesthesia Plan      History & Physical Review  History and physical reviewed and following examination; no interval change.    ASA Status:  2 .    NPO Status:  > 8 hours    Plan for General and ETT with Propofol induction. Maintenance will be Balanced.    PONV prophylaxis:  Ondansetron (or other 5HT-3) and Dexamethasone or Solumedrol  Additional equipment: Videolaryngoscope Precedex please      Postoperative Care  Postoperative pain management:  IV analgesics and Oral pain medications.      Consents  Anesthetic plan, risks, benefits and alternatives discussed with:  Patient.  Use of blood products discussed: Yes.   Use of blood products discussed with Patient.  Consented to blood products.  .                 Je Hernandez MD                    .  "

## 2019-01-04 NOTE — PROGRESS NOTES
Pt will need to remain in hard cervical collar for 12 weeks due to poor quality bone  She may remove collar to sleep, shower and when relaxing and not active, outside of that, she will need to remain in collar when active and mobilizing

## 2019-01-04 NOTE — OP NOTE
OPERATIVE NOTE        Pre op Diagnosis:   1. C4-5 stenosis with severe stenosis and spinal cord compression  2. C5-6 stenosis with severe stenosis and spinal cord compression  3. C6-7 stenosis with severe stenosis and spinal cord compression  4. Cervical myelopathy    Post op Diagnosis: Poor quality bone at C7    Procedure:   1. C4-5 anterior cervical discectomy with arthrodesis and placement of a 7 mm Nuvasive Corent PEEK interbody graft with DBX Putty placed centrally  2. C5-6 anterior cervical discectomy with arthrodesis and placement of a 6 mm Nuvasive Corent PEEK interbody graft with DBX Putty placed centrally  3. C6-7 anterior cervical discectomy with arthrodesis and placement of a 8 mm Nuvasive Corent PEEK interbody graft with DBX Putty placed centrally  4. Placement of a 62 mm Nuvasive Archon anterior cervical plate with 8 screws  5. Use of C-arm and Microscope  6. Use of intraoperative spinal cord monitoring     Surgeon: Adalid Bagley MD    Assistant: Claudy Chopra    Indication for procedure: The patient is a 68 year old female that presented with cervial myelopathy  After reviewing the patient's imaging studies and examination, the decision was made to proceed with the above procedure. The patient understood the risks and benefits of surgery and wanted to proceed.    Description of Procedure: The patient was seen in the pre op area and the procedure was discussed with the patient and family once again and all questions were answered. The consent was then signed and the patient's neck was marked with a marker. The patient was then transferred to the operating suite on a stretcher and received general endotracheal anesthesia. She was then placed on the operating table in the supine position with all pressure points padded and spinal cord monitoring leads were placed and a baseline motors were obtained. A small roll was placed under her shoulders for slight extension. Next, the C-arm fluoroscopy was  brought in the operating room for localization of the C4 to C7 disk spaces. Next, the patient's neck was then prepped and draped in a normal sterile fashion and a carotid incision was marked along the C5-6 interspace. Next, local anesthesia was placed along the planned incision and a 10 blade scalpel was used to make the incision. The platysma muscle was then identified, undermined and incised with the Metzenbaum scissors. The Weitlaner retractors were used to retract the platysma muscle and the skin edges. A dissection plane was then made with both sharp and blunt dissection medical to the sternocliedomastoid muscle and the carotid artery down to the prevertebral fascia. The esophagus and trachea were then retracted laterally with the Cloward retractor and the prevertebral fascia was clean with Kitners. A spinal need was placed in the C5-6 interspace and verified with C-arm fluoroscopy. The longus coli muscles were then elevated with the bovie cautery and the  retractor was placed under the muscle. The C4-5, C5-6 and C6-7 interspaces were incised with the bovie cautery and the disk were then removed with the Midas Jim drill down to the posterior longitudinal ligament at each level. The ligament was then penetrated with a microball hook and the Kerrison rongeur was used to remove the posterior longitudinal ligament. All foramen were decompressed and the exiting nerve roots were decompressed and verified by with the microball hook from C4-7. Size 6,7 and 8 mm interbody trials were placed in the interspaces to measure the appropriate size.. They were noted to be the appropriate size, therefore, one 7 mm PEEK interbody graft was placed in the C4-5 interspace and one 6 mm PEEK interbody graft was placed in C5-6 interspace and one 8 mm PEEK interbody graft was placed in the C6-7 interspace, all with Globus Signify putty placed centrally under fluoroscopic guidance and noted to have an appropriate fit. Next, the  size 62 mm anterior cervical plate was secured from C4 to C7 with eight screws which were locked in placed with the hand held locking bit. The wound was then copiously irrigated and hemostasis was obtained with bipolar cautery, gelfoam and Surgiflo. A SERGE drain was placed in the operative bed and the retractors were removed and there was no bleeding or injury to the carotid artery. The wound was irrigated once again and the platysma muscle was closed with 2-0 vicryl and the skin edges were closed with 3-0 vicryl and Deremabond. The wound was dressed appropriately and the patient was then extubated and transferred to recovery.    At the end of the case all counts were correct    Complications: none    Spinal cord monitoring remained normal throughout the case     Estimated blood loss: 15 ml    IV Fluid: See Anesthesia    The patient received Ancef preoperatively      Adalid Bagley MD, MS, FAANS

## 2019-01-04 NOTE — ANESTHESIA CARE TRANSFER NOTE
Patient: Gay L Gerhardt    Procedure(s):  C4-7 Anterior Cervical Discectomy and fusion    Diagnosis: severe stenosis with myelopathy  Diagnosis Additional Information: No value filed.    Anesthesia Type:   General, ETT     Note:  Airway :Face Mask  Patient transferred to:PACU  Comments: Haim Report: Identifed the Patient, Identified the Reponsible Provider, Reviewed the pertinent medical history, Discussed the surgical course, Reviewed Intra-OP anesthesia mangement and issues during anesthesia, Set expectations for post-procedure period and Allowed opportunity for questions and acknowledgement of understanding      Vitals: (Last set prior to Anesthesia Care Transfer)    CRNA VITALS  1/4/2019 1337 - 1/4/2019 1417      1/4/2019             Pulse:  68    SpO2:  100 %    Resp Rate (observed):  13                Electronically Signed By: CALVIN Feldman CRNA  January 4, 2019  2:17 PM

## 2019-01-04 NOTE — ANESTHESIA POSTPROCEDURE EVALUATION
Patient: Gay L Gerhardt    Procedure(s):  C4-7 Anterior Cervical Discectomy and fusion    Diagnosis:severe stenosis with myelopathy  Diagnosis Additional Information:    Pre op Diagnosis:   1. C4-5 stenosis with severe stenosis and spinal cord compression  2. C5-6 stenosis with severe stenosis and spinal cord compression  3. C6-7 stenosis with severe stenosis and spinal cord compression  4. Cervical myelopathy   , Post op Diagnosis: Poor quality bone at C7    Procedure:   1. C4-5 anterior cervical discectomy with arthrodesis and placement of a 7 mm Nuvasive Corent PEEK interbody graft with DBX Putty placed centrally  2. C5-6 anterior cervical discectomy with , arthrodesis and placement of a 6 mm Nuvasive Corent PEEK interbody graft with DBX Putty placed centrally  3. C6-7 anterior cervical discectomy with arthrodesis and placement of a 8 mm Nuvasive Corent PEEK interbody graft with DBX Putty placed central, ly  4. Placement of a 62 mm Nuvasive Archon anterior cervical plate with 8 screws  5. Use of C-arm and Microscope  6. Use of intraoperative spinal cord monitoring     Anesthesia Type:  General, ETT    Note:  Anesthesia Post Evaluation    Patient location during evaluation: Phase 2  Patient participation: Able to fully participate in evaluation  Level of consciousness: awake  Pain management: adequate  Airway patency: patent  Cardiovascular status: acceptable  Respiratory status: acceptable  Hydration status: euvolemic  PONV: controlled     Anesthetic complications: None          Last vitals:  Vitals:    01/04/19 1440 01/04/19 1445 01/04/19 1500   BP: 115/67 115/67 125/72   Pulse: 67     Resp: 9 8 14   Temp:      SpO2: 100% 100% 100%         Electronically Signed By: Je Hernandez MD  January 4, 2019  3:20 PM

## 2019-01-04 NOTE — TELEPHONE ENCOUNTER
Type of surgery: C4-7 ACDF  Location of surgery: [facility]  Date and time of surgery: 01/04/2019 at 9:30am   Surgeon: Dr. Bagley   Pre-Op Appt Date: 12/20/2019  Post-Op Appt Date: 02/15/2019    Packet sent out: Yes  Pre-cert/Authorization completed:  Yes  Date: 01/04/2019

## 2019-01-05 ENCOUNTER — APPOINTMENT (OUTPATIENT)
Dept: PHYSICAL THERAPY | Facility: CLINIC | Age: 69
DRG: 472 | End: 2019-01-05
Attending: NEUROLOGICAL SURGERY
Payer: COMMERCIAL

## 2019-01-05 VITALS
WEIGHT: 153 LBS | SYSTOLIC BLOOD PRESSURE: 109 MMHG | DIASTOLIC BLOOD PRESSURE: 58 MMHG | RESPIRATION RATE: 16 BRPM | HEIGHT: 61 IN | OXYGEN SATURATION: 95 % | TEMPERATURE: 97 F | BODY MASS INDEX: 28.89 KG/M2 | HEART RATE: 69 BPM

## 2019-01-05 LAB — GLUCOSE BLDC GLUCOMTR-MCNC: 140 MG/DL (ref 70–99)

## 2019-01-05 PROCEDURE — 40000193 ZZH STATISTIC PT WARD VISIT

## 2019-01-05 PROCEDURE — G0378 HOSPITAL OBSERVATION PER HR: HCPCS

## 2019-01-05 PROCEDURE — 97161 PT EVAL LOW COMPLEX 20 MIN: CPT | Mod: GP

## 2019-01-05 PROCEDURE — 25000128 H RX IP 250 OP 636: Performed by: NEUROLOGICAL SURGERY

## 2019-01-05 PROCEDURE — 25000132 ZZH RX MED GY IP 250 OP 250 PS 637: Performed by: NEUROLOGICAL SURGERY

## 2019-01-05 PROCEDURE — 00000146 ZZHCL STATISTIC GLUCOSE BY METER IP

## 2019-01-05 RX ORDER — OXYCODONE HYDROCHLORIDE 5 MG/1
5-10 TABLET ORAL EVERY 4 HOURS PRN
Qty: 75 TABLET | Refills: 0 | Status: SHIPPED | OUTPATIENT
Start: 2019-01-05 | End: 2019-02-01

## 2019-01-05 RX ORDER — METHOCARBAMOL 750 MG/1
750 TABLET, FILM COATED ORAL EVERY 6 HOURS PRN
Qty: 90 TABLET | Refills: 1 | Status: SHIPPED | OUTPATIENT
Start: 2019-01-05 | End: 2019-01-15

## 2019-01-05 RX ADMIN — CEFAZOLIN SODIUM 2 G: 2 INJECTION, SOLUTION INTRAVENOUS at 11:06

## 2019-01-05 RX ADMIN — OXYCODONE HYDROCHLORIDE 5 MG: 5 TABLET ORAL at 07:44

## 2019-01-05 RX ADMIN — DOCUSATE SODIUM 100 MG: 100 CAPSULE, LIQUID FILLED ORAL at 07:44

## 2019-01-05 RX ADMIN — DEXAMETHASONE SODIUM PHOSPHATE 4 MG: 4 INJECTION, SOLUTION INTRAMUSCULAR; INTRAVENOUS at 05:47

## 2019-01-05 RX ADMIN — Medication 0.3 MG: at 01:16

## 2019-01-05 RX ADMIN — DEXAMETHASONE SODIUM PHOSPHATE 4 MG: 4 INJECTION, SOLUTION INTRAMUSCULAR; INTRAVENOUS at 01:15

## 2019-01-05 RX ADMIN — OXYCODONE HYDROCHLORIDE 5 MG: 5 TABLET ORAL at 12:04

## 2019-01-05 RX ADMIN — ACETAMINOPHEN 975 MG: 325 TABLET, FILM COATED ORAL at 09:03

## 2019-01-05 RX ADMIN — CEFAZOLIN SODIUM 2 G: 2 INJECTION, SOLUTION INTRAVENOUS at 03:12

## 2019-01-05 NOTE — PROGRESS NOTES
01/05/19 1029   Quick Adds   Type of Visit Initial PT Evaluation   Living Environment   Lives With spouse   Living Arrangements house   Home Accessibility stairs to enter home;stairs within home  (1 step into house from garage, flight to basement)   Living Environment Comment Patient reports  is a , will be out of town.  Granddaughter will be staying with patient for 2 weeks until  is home   Self-Care   Usual Activity Tolerance moderate   Current Activity Tolerance fair   Equipment Currently Used at Home none   Activity/Exercise/Self-Care Comment Limited by pain and numbness in extremities.  Works as a    Functional Level Prior   Ambulation 0-->independent   Transferring 0-->independent   Toileting 0-->independent   Bathing 0-->independent   Communication 0-->understands/communicates without difficulty   Swallowing 0-->swallows foods/liquids without difficulty   Cognition 0 - no cognition issues reported   Fall history within last six months yes   Number of times patient has fallen within last six months 1  (slipped on a step at daughters, was in socks)   Which of the above functional risks had a recent onset or change? ambulation;transferring   Prior Functional Level Comment Patient reports being independent with all mobility prior to admission.   General Information   Onset of Illness/Injury or Date of Surgery - Date 01/04/19   Referring Physician Adalid Bagley MD   Patient/Family Goals Statement return to home, decrease pain   Pertinent History of Current Problem (include personal factors and/or comorbidities that impact the POC) Patient seen POD#1 s/p C4-C7 ACDF secondary to cervical myelopathy from severe stenosis and spinal cord compression.     General Info Comments lumbar fusion 10 years ago   Cognitive Status Examination   Orientation orientation to person, place and time   Pain Assessment   Patient Currently in Pain No   Integumentary/Edema  "  Integumentary/Edema Comments edema present at surgical site, drain present anterior neck   Posture    Posture Comments WFL   Range of Motion (ROM)   ROM Comment WFL- did not assess cervical ROM   Strength   Strength Comments WFL   Bed Mobility   Bed Mobility Comments independent with verbal cueing for log roll   Transfer Skills   Transfer Comments independent with 2WW   Gait   Gait Comments amb 100 feet independently with 2WW   Balance   Balance Comments Balance deficits apparent without AD; with walker, no LOB   Sensory Examination   Sensory Perception Comments Patient with residual numbness in right UE/LE   Clinical Impression   Criteria for Skilled Therapeutic Intervention evaluation only   PT Diagnosis decreased independence with mobility   Clinical Presentation Stable/Uncomplicated   Clinical Presentation Rationale moderately complex pmh, stable presentation, good social support   Clinical Decision Making (Complexity) Low complexity   Therapy Frequency` (evaluation only)   Predicted Duration of Therapy Intervention (days/wks) evaluation only   Anticipated Equipment Needs at Discharge (walker-has walker at home already)   Anticipated Discharge Disposition Home with Assist  (supervision during stair negotiation)   Risk & Benefits of therapy have been explained Yes   Patient, Family & other staff in agreement with plan of care Yes   [facility] noodls-[facility] [employer] TM \"6 Clicks\"   2016, Trustees of [facility], under license to [employer].  All rights reserved.   6 Clicks Short Forms Basic Mobility Inpatient Short Form   [facility] AM-PAC  \"6 Clicks\" V.2 Basic Mobility Inpatient Short Form   1. Turning from your back to your side while in a flat bed without using bedrails? 4 - None   2. Moving from lying on your back to sitting on the side of a flat bed without using bedrails? 4 - None   3. Moving to and from a bed to a chair (including a wheelchair)? 3 - A Little   4. Standing up from a chair using your " arms (e.g., wheelchair, or bedside chair)? 3 - A Little   5. To walk in hospital room? 3 - A Little   6. Climbing 3-5 steps with a railing? 3 - A Little   Basic Mobility Raw Score (Score out of 24.Lower scores equate to lower levels of function) 20   Total Evaluation Time   Total Evaluation Time (Minutes) 30

## 2019-01-05 NOTE — PLAN OF CARE
Pt A&O x4. VS stable; afebrile. PO oxycodone and scheduled tylenol managing pain. CMS: numbness and tingling in BLE's-had prior to surgery. Dressing changed-CDI. SERGE drain pulled. Up w/ SBA, using gait belt and brace. Voiding in good amts. Tolerating mech/soft diet. No nausea this shift.      Reviewed discharge instructions and medications with patient. Questions answered. Patient discharged to home via, daughter w/ discharge instructions, filled medications (Robaxin and Oxycodone), and belongings at this time.

## 2019-01-05 NOTE — PLAN OF CARE
Pt A&Ox4. Up with A1 to bedside commode. Cervical collar in place. Dressing CDI. Numbness to BLE, improving per pt. Voided. , cathed 215. Bowels hypoactive. Tolerating full liquid diet. SERGE in place, 20cc. Capno, 2L/min.VSS

## 2019-01-05 NOTE — PLAN OF CARE
Discharge Planner OT   Patient plan for discharge: home with assist  Current status: Pt was referred for OT eval s/p C4-C7 ACDF secondary to cervical myelopathy from severe stenosis and spinal cord compression.  Per PT, Pt able to complete ADLs and mobility independently with no skilled OT needs identified at this time.  Barriers to return to prior living situation: none  Recommendations for discharge: home with assist  Rationale for recommendations: OT to complete order with no equipment needs and good understanding of spinal precautions.       Entered by: Concha Rothman 01/05/2019 1:12 PM

## 2019-01-05 NOTE — PLAN OF CARE
Noc RN- Pt had 600cc emesis earlier this shift. Part of neck dressing changed/reinforced due to soiling. Pt's pain controlled with iv dilaudid x1, Pt voiding at BSC, numbness and tingling to feet. Vital signs stable

## 2019-01-05 NOTE — PLAN OF CARE
PT: Patient seen by physical therapy for evaluation.  Patient seen POD#1 s/p C4-C7 ACDF secondary to cervical myelopathy from severe stenosis and spinal cord compression.  Patient lives in single story [facility] (has basement but does not need to access) with 1 step to enter.  Patient will have 24/7 care for 2 weeks from granddaughter at discharge until  returns from work ( is a ).  Patient reports limited acitvity tolerance (approx 15-20 min standing/walking) prior to surgery secondary to UE/LE symptoms.  Patient reported numbness and tingling in right UE/LE and left LE.  Patient reporting left LE symptoms have resolved since surgery.  Patient has not been using an assistive device since surgery, but does own a walker.  Patient has walk in shower, bed with elevating HOB.    Discharge Planner PT   Patient plan for discharge: home with 24/7 care from granddaughter for 2 weeks  Current status: Patient supine upon arrival. Education provided on spinal precautions.  Patient transferred to sitting at EOB through log roll with verbal cueing for technique.  Patient required assist to don cervical brace, tighten appropriately.  Patient amb 100 feet with CGA and no assistive device, patient with significant slow gait speed, reaching out for support surface (wall/railing).  Patient amb 100 feet with 2WW independently with improved gait speed, no loss of balance.  Recommended use of walker with mobility at discharge.  Patient negotiated single step using walker with supervision.  Returned to supine upon return to room.  Barriers to return to prior living situation: none  Recommendations for discharge: home with 24/7 care from granddaughter for 2 weeks, use of 2WW with all mobility  Rationale for recommendations: Patient presents with decreased balance following surgery, however is independent with use of 2WW.  Has demonstrated understanding of spinal precautions and independence with functional  mobility.  No further inpatient needs at this time.  Will complete PT orders.  No OT needs at this time.       Entered by: Mary Ann Klein 01/05/2019 11:08 AM     Physical Therapy Discharge Summary    Reason for therapy discharge:    Evaluation only     Progress towards therapy goal(s). See goals on Care Plan in [facility] electronic health record for goal details.  NA-evaluation only     Therapy recommendation(s):    Use walker with all mobility, supervision with single step into home.

## 2019-01-05 NOTE — DISCHARGE SUMMARY
[facility]    Discharge Summary  Neurosurgery    Date of Admission:  1/4/2019  Date of Discharge:  1/5/2019  Discharging Provider: Vanesa Sharma  Date of Service (when I saw the patient): 01/05/19    Discharge Diagnoses   Active Problems:    S/P cervical spinal fusion      History of Present Illness   Gay L Gerhardt is an 68 year old female who presented with cervical myelopathy.  She was found to have C4-7 stenosis with severe stenosis and spinal cord compression.  She underwent a C4-7 ACDF with Dr. Adalid Bagley on 1-4-2018.  Today pt is sitting up in bed. She notes that  Last night she had nausea with emesis.  Today she is feeling good and ate breakfast without difficulty. She is ready to discharge home with family.  She was educated that she will need to wear the cervical collar when out of bed for 12 weeks.      Hospital Course   Gay L Gerhardt was admitted on 1/4/2019.  The following problems were addressed during her hospitalization:    Active Problems:    S/P cervical spinal fusion    Assessment: stable     Plan: discharge home         I have discussed the following assessment and plan with Dr. Adalid Bagley  who is in agreement with initial plan and will follow up with further consultation recommendations.    Vanesa Sharma [facility]  [facility]  [place] [address]    Tel [phone]  Pager [phone]        Significant Results and Procedures   C4-7 ACDF    Pending Results     Unresulted Labs Ordered in the Past 30 Days of this Admission     No orders found for last 61 day(s).          Code Status   Full Code    Primary Care Physician   Bridgett Delgado    Physical Exam   Temp: 97.4  F (36.3  C) Temp src: Oral BP: 126/62 Pulse: 69 Heart Rate: 60 Resp: 16 SpO2: 100 % O2 Device: None (Room air) Oxygen Delivery: 2 LPM  Vitals:    01/04/19 0952   Weight: 153 lb (69.4 kg)     Vital Signs with Ranges  Temp:  [96.1   F (35.6  C)-97.7  F (36.5  C)] 97.4  F (36.3  C)  Pulse:  [] 69  Heart Rate:  [59-78] 60  Resp:  [8-18] 16  BP: (110-146)/(55-80) 126/62  SpO2:  [95 %-100 %] 100 %  I/O last 3 completed shifts:  In: 5610 [P.O.:2240; I.V.:3370]  Out: 3930 [Urine:3265; Emesis/NG output:600; Drains:50; Blood:15]    Constitutional: Awake, alert, cooperative, no apparent distress, and appears stated age.  Eyes: Lids and lashes normal, pupils equal, round and reactive to light, extra ocular muscles intact  ENT: Normocephalic, without obvious abnormality, atraumatic  Respiratory: No increased work of breathing  Skin: No bruising or bleeding, normal skin color, texture, turgor, no redness, warmth, or swelling.  Incision with dressing intact   Musculoskeletal: There is no redness, warmth, or swelling of the joints.  Full range of motion noted.  Motor strength is 5 out of 5 all extremities bilaterally.  Tone is normal.  Neurologic: Awake, alert, oriented to name, place and time.  Cranial nerves II-XII are grossly intact.  Motor is 5 out of 5 bilaterally.     Neuropsychiatric: Calm, normal eye contact, alert, normal affect, oriented to self, place, time and situation, memory for past and recent events intact and thought process normal.       Time Spent on this Encounter   I, Vanesa Sharma, personally saw the patient today and spent greater than 30 minutes discharging this patient.    Discharge Disposition   Discharged to home  Condition at discharge: Stable    Consultations This Hospital Stay   OCCUPATIONAL THERAPY ADULT IP CONSULT  PHYSICAL THERAPY ADULT IP CONSULT    Discharge Orders      X-ray Cervical spine 2-3 vws     Reason for your hospital stay    You were in the hospital for a C4-7 ACDF     Follow-up and recommended labs and tests     Please follow up at the [facility] in 6 weeks with a xray prior.  Please call the clinic at [phone] to schedule your appointment with Vanesa Sharma CNP or Kathryn Arguelles  CNP     Wound care and dressings    Instructions to care for your wound at home:  Keep your incision clean and dry at all times.  OK to remove dressing on postop day 2.  OK to shower on postop day 3 and allow water to run over incision, pat dry after shower.  No bathing swimming or submerging in water.  Call immediately or come to ED if any drainage occurs, or you develop new pain, redness, or swelling.     Activity    Your activity upon discharge: Discharge instructions:  No lifting of more than 10 pounds, no bending, no twisting until follow up visit.  Wear brace when out of bed for for 12 weeks   Ok to shampoo hair, shower or bathe, but, do not scrub or submerge incision under water until evaluated post-operatively in clinic.    Ok to walk as tolerated, avoid bed rest and prolonged sitting.    No contact sports until after follow up visit  No high impact activities such as; running/jogging, snowmobile or 4 beckman riding or any other recreational vehicles.    Do not take NSAIDS (ibuprofen, advil, aleve, naproxen, etc) for pain control.    Do NOT drive while taking narcotic pain medication.    Call the [facility] at [phone] for increasing redness, swelling or pus draining from the incision, increased pain or any other questions and concerns.     Diet    Follow this diet upon discharge: advance diet as tolerated     Discharge Medications   Current Discharge Medication List      START taking these medications    Details   methocarbamol (ROBAXIN) 750 MG tablet Take 1 tablet (750 mg) by mouth every 6 hours as needed for muscle spasms  Qty: 90 tablet, Refills: 1    Associated Diagnoses: S/P cervical spinal fusion      oxyCODONE (ROXICODONE) 5 MG tablet Take 1-2 tablets (5-10 mg) by mouth every 4 hours as needed  Qty: 75 tablet, Refills: 0    Associated Diagnoses: S/P cervical spinal fusion         CONTINUE these medications which have NOT CHANGED    Details   cholecalciferol (D3 HIGH POTENCY) 2000  units CAPS Take 4,000 Units by mouth daily      escitalopram (LEXAPRO) 10 MG tablet Take 10 mg by mouth At Bedtime      FOSAMAX 70 MG OR TABS ONE TABLET WEEKLY  Qty: 3 MONTHS, Refills: 1 YEAR      MULTIVITAMINS OR TABS ONE DAILY  Qty: 100, Refills: 1 YEAR      NONFORMULARY Take 1,000 mg by mouth daily Calcium      pramipexole (MIRAPEX) 0.25 MG tablet Take 0.25 mg by mouth At Bedtime      simvastatin (ZOCOR) 20 MG tablet Take 20 mg by mouth At Bedtime      VITAMIN E 400 UNIT OR CAPS ONE CAPSULE DAILY  Qty: 3 MONTHS, Refills: 1 YEAR         STOP taking these medications       ASPIRIN 325 MG OR TBEC Comments:   Reason for Stopping:             Allergies   Allergies   Allergen Reactions     Paxil [Paroxetine] Fatigue     Pt had hallucinations and very negative, destructive feelings.

## 2019-02-01 DIAGNOSIS — Z98.1 S/P CERVICAL SPINAL FUSION: ICD-10-CM

## 2019-02-01 RX ORDER — OXYCODONE HYDROCHLORIDE 5 MG/1
5 TABLET ORAL EVERY 6 HOURS PRN
Qty: 50 TABLET | Refills: 0 | Status: SHIPPED | OUTPATIENT
Start: 2019-02-01 | End: 2019-02-15

## 2019-02-01 NOTE — TELEPHONE ENCOUNTER
Patient LVM on nurse line requesting oxycodone med refill. She is s/p C4-7 ACDF with Dr. Bagley on 1/4/19. Patient reports taking up to 4 tabs per day and has 10 tabs remaining. Her last refill was on 1/5/19.     She would like rx mailed to [facility] in [place]. [facility] approves refill. Patient will follow-up in clinic for post op appt as scheduled. Advised patient to call back with further questions or concerns.

## 2019-02-15 ENCOUNTER — OFFICE VISIT (OUTPATIENT)
Dept: NEUROSURGERY | Facility: CLINIC | Age: 69
End: 2019-02-15
Attending: NURSE PRACTITIONER
Payer: COMMERCIAL

## 2019-02-15 ENCOUNTER — ANCILLARY PROCEDURE (OUTPATIENT)
Dept: GENERAL RADIOLOGY | Facility: CLINIC | Age: 69
End: 2019-02-15
Payer: COMMERCIAL

## 2019-02-15 VITALS
OXYGEN SATURATION: 99 % | BODY MASS INDEX: 28.16 KG/M2 | TEMPERATURE: 97.3 F | WEIGHT: 153 LBS | HEART RATE: 78 BPM | DIASTOLIC BLOOD PRESSURE: 75 MMHG | SYSTOLIC BLOOD PRESSURE: 131 MMHG | HEIGHT: 62 IN

## 2019-02-15 DIAGNOSIS — Z98.1 S/P CERVICAL SPINAL FUSION: ICD-10-CM

## 2019-02-15 DIAGNOSIS — Z98.1 STATUS POST CERVICAL SPINAL FUSION: Primary | ICD-10-CM

## 2019-02-15 PROCEDURE — 72040 X-RAY EXAM NECK SPINE 2-3 VW: CPT

## 2019-02-15 PROCEDURE — G0463 HOSPITAL OUTPT CLINIC VISIT: HCPCS

## 2019-02-15 PROCEDURE — 99024 POSTOP FOLLOW-UP VISIT: CPT | Performed by: NURSE PRACTITIONER

## 2019-02-15 RX ORDER — METHOCARBAMOL 750 MG/1
750 TABLET, FILM COATED ORAL 3 TIMES DAILY
Qty: 90 TABLET | Refills: 1 | Status: SHIPPED | OUTPATIENT
Start: 2019-02-15

## 2019-02-15 RX ORDER — OXYCODONE HYDROCHLORIDE 5 MG/1
5-10 TABLET ORAL EVERY 6 HOURS PRN
Qty: 75 TABLET | Refills: 0 | Status: SHIPPED | OUTPATIENT
Start: 2019-02-15

## 2019-02-15 ASSESSMENT — MIFFLIN-ST. JEOR: SCORE: 1169.31

## 2019-02-15 ASSESSMENT — PAIN SCALES - GENERAL: PAINLEVEL: MODERATE PAIN (5)

## 2019-02-15 NOTE — LETTER
February 15, 2019      To Whom it May Concern,          Gay L Gerhardt was seen in clinic on 2-.  She is able to return to work with the following restrictions on Monday March 4, 2019:      Restrictions:    - From 3-4-19 to 3-11-19 max 4 hours shifts  - Starting 3-12-19 may go to 8 hours shifts  - No lifting greater than 20 pounds.  - No repetitive bending, twisting, or overhead reaching  - 5 minute hourly stretch breaks     - Will reassess at next appointment in 6 weeks.       Sincerely,                Vnaesa Sharma CNP  [facility]  [address] [address]    Tel [phone]

## 2019-02-15 NOTE — LETTER
2/15/2019         RE: Gay L Gerhardt  [address]  [facility] [address]        Dear Colleague,    Thank you for referring your patient, Gay L Gerhardt, to the [facility]. Please see a copy of my visit note below.    [facility]  Neurosurgery followup:    HPI: Ms. Gerhardt is a 69 year old female that returns about 6 weeks post C4-7 ACDF with Dr. Adalid Bagley on 1-4-2019. Prior to surgery she was found to have cervical myelopathy due to severe stenosis and spinal cord compression.  She returns today noting ongoing numbness and tingling to her hands and feet. She rates her pain 5/10. She was educated that due to the cord compression this may take some times to resolve and may not fully resolve.      Exam:  Constitutional:  Alert, well nourished, NAD.  HEENT: Normocephalic, atraumatic.   Pulm:  Without shortness of breath   CV:  No pitting edema of BLE.     Neurological:  Awake  Alert  Oriented x 3  Motor exam:     Shoulder Abduction:  Right:  5/5    Left:  5/5  Biceps:                      Right:  5/5    Left:  5/5  Triceps:                     Right:  5/5    Left:  5/5  Wrist Extensors:       Right:  5/5    Left:  5/5  Wrist Flexors:           Right:  5/5    Left:  5/5  Intrinsics:                  Right:  5/5    Left:  5/5     Able to spontaneously move U/E bilaterally  Sensation intact throughout all U/E dermatomes    Incisions:  Cervical incision healed     Imaging:  Cervical fusion intact      A/P: Ms. Gerhardt is a 69 year old female that returns about 6 weeks post C4-7 ACDF with Dr. Adalid Bagley on 1-4-2019. Prior to surgery she was found to have cervical myelopathy due to severe stenosis and spinal cord compression.  She returns today noting ongoing numbness and tingling to her hands and feet. She rates her pain 5/10. She was educated that due to the cord compression this may take some times to resolve and may not fully resolve.  We will refill her Robaxin  and increase her Oxycodone as well.  She was educated that she needs to wear her brace for another 6 weeks.  She verbalized understanding. She would like to try to go back in March.      Patient Instructions   - May increase lifting restriction to 20  Pounds    - Continue to wear brace     - followup in 6 weeks  with xray prior     - Call the clinic at [phone] for increased pain or any other questions and concerns.               Vanesa Sharma CNP  [facility]  [address] [address]    Tel [phone]  Pager [phone]      Again, thank you for allowing me to participate in the care of your patient.        Sincerely,        CALVIN Gupta CNP

## 2019-02-15 NOTE — NURSING NOTE
"Gay L Gerhardt is a 69 year old female who presents for:  Chief Complaint   Patient presents with     Neurologic Problem     6 week follow up status post cervical fusion DOS 01/04/2019, continues to have N/T in the hands and toes and once in a while has neck pain.        Vitals:    Vitals:    02/15/19 0918   BP: 131/75   BP Location: Right arm   Patient Position: Sitting   Cuff Size: Adult Large   Pulse: 78   Temp: 97.3  F (36.3  C)   TempSrc: Oral   SpO2: 99%   Weight: 153 lb (69.4 kg)   Height: 5' 1.5\" (1.562 m)       BMI:  Estimated body mass index is 28.44 kg/m  as calculated from the following:    Height as of this encounter: 5' 1.5\" (1.562 m).    Weight as of this encounter: 153 lb (69.4 kg).    Pain Score:  Moderate Pain (5)      Do you feel safe in your environment?  Yes      Leena Willis          "

## 2019-02-15 NOTE — PATIENT INSTRUCTIONS
- May increase lifting restriction to 20  Pounds    - Continue to wear brace     - followup in 6 weeks  with xray prior on a day Dr. Adalid Bagley is in clinic     - Call the clinic at [phone] for increased pain or any other questions and concerns.

## 2019-02-15 NOTE — PROGRESS NOTES
[facility]  Neurosurgery followup:    HPI: Ms. Gerhardt is a 69 year old female that returns about 6 weeks post C4-7 ACDF with Dr. Adalid Bagley on 1-4-2019. Prior to surgery she was found to have cervical myelopathy due to severe stenosis and spinal cord compression.  She returns today noting ongoing numbness and tingling to her hands and feet. She rates her pain 5/10. She was educated that due to the cord compression this may take some times to resolve and may not fully resolve.      Exam:  Constitutional:  Alert, well nourished, NAD.  HEENT: Normocephalic, atraumatic.   Pulm:  Without shortness of breath   CV:  No pitting edema of BLE.     Neurological:  Awake  Alert  Oriented x 3  Motor exam:     Shoulder Abduction:  Right:  5/5    Left:  5/5  Biceps:                      Right:  5/5    Left:  5/5  Triceps:                     Right:  5/5    Left:  5/5  Wrist Extensors:       Right:  5/5    Left:  5/5  Wrist Flexors:           Right:  5/5    Left:  5/5  Intrinsics:                  Right:  5/5    Left:  5/5     Able to spontaneously move U/E bilaterally  Sensation intact throughout all U/E dermatomes    Incisions:  Cervical incision healed     Imaging:  Cervical fusion intact      A/P: Ms. Gerhardt is a 69 year old female that returns about 6 weeks post C4-7 ACDF with Dr. Adalid Bagley on 1-4-2019. Prior to surgery she was found to have cervical myelopathy due to severe stenosis and spinal cord compression.  She returns today noting ongoing numbness and tingling to her hands and feet. She rates her pain 5/10. She was educated that due to the cord compression this may take some times to resolve and may not fully resolve.  We will refill her Robaxin and increase her Oxycodone as well.  She was educated that she needs to wear her brace for another 6 weeks.  She verbalized understanding. She would like to try to go back in March.      Patient Instructions   - May increase lifting restriction to 20   [place]    - Continue to wear brace     - followup in 6 weeks  with xray prior     - Call the clinic at [phone] for increased pain or any other questions and concerns.               Vanesa Sharma [facility]  [facility]  [address] [address]    Tel [phone]  Pager [phone]

## 2019-02-18 DIAGNOSIS — Z98.1 S/P CERVICAL SPINAL FUSION: Primary | ICD-10-CM

## 2019-02-18 RX ORDER — TIZANIDINE 2 MG/1
2-4 TABLET ORAL 3 TIMES DAILY PRN
Qty: 90 TABLET | Refills: 0 | Status: SHIPPED | OUTPATIENT
Start: 2019-02-18 | End: 2019-04-03

## 2019-03-12 ENCOUNTER — TELEPHONE (OUTPATIENT)
Dept: NEUROSURGERY | Facility: CLINIC | Age: 69
End: 2019-03-12

## 2019-03-12 NOTE — TELEPHONE ENCOUNTER
REASON FOR CALL: Pt LVM stating that she has a bowling tournament on the 23rd, and she is wondering if she will be able to bowl. Would like to talk w/and RN in regards to this.

## 2019-03-12 NOTE — TELEPHONE ENCOUNTER
Called and spoke with patient I informed her that as long as she follows her restrictions of lifting no more than 20 lbs. And continues to wear neck brace she should be fine per ramona FLORES. I also discussed that she needs to listen to her body and if she is having increased pain or symptoms while bowling she should stop. Patient is in agreement with plan and states she is going to bowl a few games this weekend to see how she feels prior to the tournament.

## 2019-04-03 ENCOUNTER — ANCILLARY PROCEDURE (OUTPATIENT)
Dept: GENERAL RADIOLOGY | Facility: CLINIC | Age: 69
End: 2019-04-03
Attending: NURSE PRACTITIONER
Payer: COMMERCIAL

## 2019-04-03 ENCOUNTER — OFFICE VISIT (OUTPATIENT)
Dept: NEUROSURGERY | Facility: CLINIC | Age: 69
End: 2019-04-03
Payer: COMMERCIAL

## 2019-04-03 VITALS
WEIGHT: 150 LBS | OXYGEN SATURATION: 98 % | TEMPERATURE: 97.1 F | SYSTOLIC BLOOD PRESSURE: 134 MMHG | BODY MASS INDEX: 27.6 KG/M2 | HEART RATE: 73 BPM | HEIGHT: 62 IN | DIASTOLIC BLOOD PRESSURE: 75 MMHG

## 2019-04-03 DIAGNOSIS — Z98.1 STATUS POST CERVICAL SPINAL FUSION: ICD-10-CM

## 2019-04-03 DIAGNOSIS — Z98.1 S/P CERVICAL SPINAL FUSION: ICD-10-CM

## 2019-04-03 PROCEDURE — 72040 X-RAY EXAM NECK SPINE 2-3 VW: CPT

## 2019-04-03 PROCEDURE — 99024 POSTOP FOLLOW-UP VISIT: CPT | Performed by: NURSE PRACTITIONER

## 2019-04-03 PROCEDURE — G0463 HOSPITAL OUTPT CLINIC VISIT: HCPCS

## 2019-04-03 RX ORDER — TIZANIDINE 2 MG/1
4 TABLET ORAL 3 TIMES DAILY PRN
Qty: 90 TABLET | Refills: 0 | Status: SHIPPED | OUTPATIENT
Start: 2019-04-03

## 2019-04-03 ASSESSMENT — PAIN SCALES - GENERAL: PAINLEVEL: MODERATE PAIN (5)

## 2019-04-03 ASSESSMENT — MIFFLIN-ST. JEOR: SCORE: 1155.71

## 2019-04-03 NOTE — PATIENT INSTRUCTIONS
-May ease into normal activities  -Tizanidine as needed  -Follow up in 3 months with Xrays prior (you may follow up at [place] or follow Dr. Bagley at [facility] and number to call is [phone])    -Please contact the clinic if pain persists at [phone].

## 2019-04-03 NOTE — PROGRESS NOTES
"[facility]  Neurosurgery followup:    HPI: Ms. Gerhardt is a 69 year old female who returns post C4-7 ACDF with Dr. Adalid Bagley on 1-4-2019.  She is here for her 3 month post op appointment.  She states the her arms and legs \"are better.\"  She states, \"They don't have that numbness.\"  She states she has throbbing pain to the right side of her neck, that has increased the past few days.  She has been applying heat with some benefit.  She is taking OTC Ibuprofen prn.  She no longer is taking prescription pain medication.  She denies weakness or falls.  She denies changes to bowel or bladder.     Exam:  Constitutional:  Alert, well nourished, NAD.  HEENT: Normocephalic, atraumatic.   Pulm:  Without shortness of breath   CV:  No pitting edema of BLE.     Neurological:  Awake  Alert  Oriented x 3  Motor exam:     Shoulder Abduction:  Right:  5/5    Left:  5/5  Biceps:                      Right:  5/5    Left:  5/5  Triceps:                     Right:  5/5    Left:  5/5  Wrist Extensors:       Right:  5/5    Left:  5/5  Wrist Flexors:           Right:  5/5    Left:  5/5  Intrinsics:                  Right:  5/5    Left:  5/5     Able to spontaneously move U/E bilaterally  Sensation intact throughout all U/E dermatomes  Incision: Clean, dry and intact.    Imaging:  Per XR fusion appears intact and stable    A/P:  Ms. Gerhardt is a 69 year old female who returns post C4-7 ACDF with Dr. Adalid Bagley on 1-4-2019.  She is here for her 3 month post op appointment.  She states the her arms and legs \"are better.\"  She states, \"They don't have that numbness.\"  She states she has throbbing pain to the right side of her neck, that has increased the past few days.  She has been applying heat with some benefit.  She is taking OTC Ibuprofen prn.  She no longer is taking prescription pain medication.  She denies weakness or falls.  She denies changes to bowel or bladder. She is interested in a refill for Zanaflex.  " She is unsure if she took that, but states she is no longer taking a muscle relaxant.  She states she has not had drowsiness with them in the past.  She will return in 3 months for follow up.    Patient Instructions   -May ease into normal activities  -Tizanidine as needed  -Follow up in 3 months with Xrays prior (you may follow up at [facility] or follow Dr. Bagley at [facility] and number to call is [phone])    -Please contact the clinic if pain persists at [phone].        Kathryn Arguelles [facility]  [facility]  [address]  [address] [place] [address]    Tel [phone]  Pager [phone]

## 2019-04-03 NOTE — NURSING NOTE
"Gay L Gerhardt is a 69 year old female who presents for:  Chief Complaint   Patient presents with     Surgical Followup     90 day follow up s/p 1/4/14 C4-7 ACDF.        Initial Vitals:  /75 (BP Location: Right arm, Patient Position: Sitting, Cuff Size: Adult Large)   Pulse 73   Temp 97.1  F (36.2  C) (Oral)   Ht 5' 1.5\" (1.562 m)   Wt 150 lb (68 kg)   SpO2 98%   BMI 27.88 kg/m   Estimated body mass index is 27.88 kg/m  as calculated from the following:    Height as of this encounter: 5' 1.5\" (1.562 m).    Weight as of this encounter: 150 lb (68 kg).. Body surface area is 1.72 meters squared. BP completed using cuff size: large  Moderate Pain (5)        Nursing Comments: Pain level today is a 5        Mallory Souza    "

## 2021-03-23 ENCOUNTER — TELEPHONE (OUTPATIENT)
Dept: NEUROSURGERY | Facility: CLINIC | Age: 71
End: 2021-03-23

## 2021-03-23 NOTE — TELEPHONE ENCOUNTER
Reason for call: pt called to ask what material was used for her surgery in 2019. Please call her back at [phone]. Thank you

## 2021-03-23 NOTE — TELEPHONE ENCOUNTER
Attempted to reach out to patient, no answer. Left voice message for patient to call clinic back to further discuss.

## 2022-02-23 PROCEDURE — 88305 TISSUE EXAM BY PATHOLOGIST: CPT | Mod: TC,ORL | Performed by: INTERNAL MEDICINE

## 2022-02-24 ENCOUNTER — LAB REQUISITION (OUTPATIENT)
Dept: LAB | Facility: CLINIC | Age: 72
End: 2022-02-24
Payer: COMMERCIAL

## 2022-02-24 DIAGNOSIS — K63.5 POLYP OF COLON: ICD-10-CM

## 2022-02-24 DIAGNOSIS — K62.5 HEMORRHAGE OF ANUS AND RECTUM: ICD-10-CM

## 2022-02-24 DIAGNOSIS — K57.30 DIVERTICULOSIS OF LARGE INTESTINE WITHOUT PERFORATION OR ABSCESS WITHOUT BLEEDING: ICD-10-CM

## 2022-02-25 LAB
PATH REPORT.COMMENTS IMP SPEC: NORMAL
PATH REPORT.COMMENTS IMP SPEC: NORMAL
PATH REPORT.FINAL DX SPEC: NORMAL
PATH REPORT.GROSS SPEC: NORMAL
PATH REPORT.MICROSCOPIC SPEC OTHER STN: NORMAL
PATH REPORT.RELEVANT HX SPEC: NORMAL
PHOTO IMAGE: NORMAL

## 2022-02-25 PROCEDURE — 88305 TISSUE EXAM BY PATHOLOGIST: CPT | Mod: 26 | Performed by: PATHOLOGY

## 2022-05-28 NOTE — LETTER
12/19/2018         RE: Gay L Gerhardt  [address]  [facility] [address]        Dear Colleague,    Thank you for referring your patient, Gay L Gerhardt, to the [facility]. Please see a copy of my visit note below.    Neurosurgery Spine Consult [facility]      CC: Cervical myelopathy    Primary care Provider: System, Provider Not In    I was asked to see the patient by:  Leonie Argueta MD  [facility]  [address] [facility] [address]  [place] [address]      [place]: Gay L Gerhardt is a 68 year old female that presents to clinic with a complaint of numbness and tingling in the bilateral upper and bilateral lower extremities with difficulty walking.  The patient describes having progressive instability and weakness and difficulty with fine motor bilateral upper extremity numbness and tingling in bilateral lower extremity numbness and tingling.  She has not had therapy or injections and would not benefit from either.  She has difficulty with picking up small objects and ambulating and feels that this is progressively worsened over a period of time.  Her bowel and bladder function is intact.      Past Medical History:   Diagnosis Date     Anxiety state, unspecified      Depressive disorder, not elsewhere classified      Osteoporosis, unspecified      Postconcussion syndrome 2002    headaches - MCON       Past Surgical History:   Procedure Laterality Date     HYSTERECTOMY, ERICA      Hysterectomy,       Current Outpatient Medications   Medication     ALPRAZOLAM 0.5 MG OR TABS     ALPRAZOLAM 0.5 MG OR TABS     ASPIRIN 325 MG OR TBEC     BUSPIRONE HCL 5 MG OR TABS     BUSPIRONE HCL 5 MG OR TABS     CALCIUM 600 600 MG OR TABS     FOSAMAX 70 MG OR TABS     LIPITOR 10 MG OR TABS     LISINOPRIL 10 MG OR TABS     MULTIVITAMINS OR TABS     NAPROXEN 500 MG OR TABS     VITAMIN C 500 MG OR TABS     VITAMIN E 400 UNIT OR CAPS     No current  Dr. Melo facility-administered medications for this visit.        Allergies   Allergen Reactions     Paxil [Paroxetine] Fatigue     Pt had hallucinations and very negative, destructive feelings.        Social History     Socioeconomic History     Marital status:      Spouse name: None     Number of children: None     Years of education: None     Highest education level: None   Social Needs     Financial resource strain: None     Food insecurity - worry: None     Food insecurity - inability: None     Transportation needs - medical: None     Transportation needs - non-medical: None   Occupational History     None   Tobacco Use     Smoking status: Passive Smoke Exposure - Never Smoker     Smokeless tobacco: Never Used   Substance and Sexual Activity     Alcohol use: No     Drug use: No     Sexual activity: None   Other Topics Concern     Parent/sibling w/ CABG, MI or angioplasty before 65F 55M? Not Asked   Social History Narrative     None       Family History   Problem Relation Age of Onset     Thyroid Disease Mother         born 1929     Cancer Father          42yo Leukemia     Family History Negative Sister      Family History Negative Sister      Alcohol/Drug Sister         alcoholic and smoker     Family History Negative Daughter      Family History Negative Son      Heart Disease Maternal Grandmother          61yo          Review Of Systems  Skin: negative  Eyes: negative  Ears/Nose/Throat: negative  Respiratory: No shortness of breath, dyspnea on exertion, cough, or hemoptysis  Cardiovascular: negative  Gastrointestinal: negative  Genitourinary: negative  Musculoskeletal: as above and neck pain  Neurologic: as above  Psychiatric: negative  Hematologic/Lymphatic/Immunologic: negative  Endocrine: negative    B/P: 137/85, T: 98.8, P: 78, R: Data Unavailable    Examination:  Normal affect and mood  No obvious labored respiration  No skin lesions noted   No obvious pitting edema  No abnormal psychiatric  behavior  No obvious musculoskeletal abnormalities   Awake  Alert  Oriented x 3  Speech clear  Cranial nerves II - XII intact  Face symmetric  Tongue midline  Neck nontender  Normal ROM of neck  Motor exam    RUE - deltoid 5/5, biceps 4/5, triceps 4/5, wrist extension 4/5, wrist flexion 4/5,  4+/5   LUE - deltoid 5/5, biceps 5/5, triceps 5/5, wrist extension 5/5, wrist flexion 5/5,  5/5     Sensation decreased in BUE and BLE  Clonus 2-3 beats  DTR 3+ throughout the patella tendon  Smith's sign positive  Spurling's sign positive  Ambulation slightly unstable    Imaging:   MRI cervical spine reveals C4-5 moderately severe stenosis, C5-6 severe stenosis and C6-7 various and severe stenosis with cord compression and myelomalacia        Diagnosis:  C8-year-old female with severe cervical stenosis from C4-7 with myelopathy and myelomalacia      Assessment/Plan:   I recommended we proceed with a C4-7 anterior cervical discectomy and fusion the first week of January.I discussed with the patient the risk of surgery to include, but, not be limited to; dysphagia, hoarseness, paralyzed vocal cord, nerve/spinal cord injury, pseudoarthrosis, failure of hardware, failure of improvement of symptoms, CSF leak,  infection, post op hematoma, the need for recurrent surgery, paralysis, coma and death        Adalid Bagley MD, MS, FAANS  Neurosurgeon  [facility]  [facility]  [address] [place] [address], [address]  [place] [address] [phone]      Again, thank you for allowing me to participate in the care of your patient.        Sincerely,        Adalid Bagley MD     ,

## (undated) DEVICE — NDL SPINAL 22GA 3.5" QUINCKE 405181

## (undated) DEVICE — SU DERMABOND ADVANCED .7ML DNX12

## (undated) DEVICE — PEN MARKING SKIN W/LABELS 31145884

## (undated) DEVICE — ESU CLEANER TIP 31142717

## (undated) DEVICE — GLOVE PROTEXIS W/NEU-THERA 7.5  2D73TE75

## (undated) DEVICE — MIDAS REX DISSECTING TOOL  14MH30

## (undated) DEVICE — IOM 15 MIN UP TO 7 HOURS

## (undated) DEVICE — TAPE DURAPORE 3" SILK 1538-3

## (undated) DEVICE — SU VICRYL 3-0 SH CR 8X18" J774

## (undated) DEVICE — IOM SUPPLIES

## (undated) DEVICE — LINEN POUCH DBL 5427

## (undated) DEVICE — DRSG TELFA ISLAND 4X10"

## (undated) DEVICE — DRAIN JACKSON PRATT CHANNEL 10FR RND SIL W/TROCAR JP-2227

## (undated) DEVICE — SPONGE COTTONOID 1/2X1/2" 80-1400

## (undated) DEVICE — DECANTER BAG 2002S

## (undated) DEVICE — ESU ELEC BLADE 2.75" COATED/INSULATED E1455

## (undated) DEVICE — TUBING SUCTION MEDI-VAC SOFT 3/16"X20' N520A

## (undated) DEVICE — GLOVE PROTEXIS BLUE W/NEU-THERA 8.5  2D73EB85

## (undated) DEVICE — Device

## (undated) DEVICE — PACK SMALL SPINE RIDGES

## (undated) DEVICE — DRAPE X-RAY TUBE 00-901169-01-OEC

## (undated) DEVICE — DRSG KERLIX FLUFFS X5

## (undated) DEVICE — LINEN ORTHO ACL PACK 5447

## (undated) DEVICE — SU VICRYL 2-0 CT-2 CR 8X18" J726D

## (undated) DEVICE — DRAPE MAYO STAND 23X54 8337

## (undated) DEVICE — ESU GROUND PAD ADULT W/CORD E7507

## (undated) DEVICE — SPONGE KITTNER 30-101

## (undated) DEVICE — SPONGE SURGIFOAM 100 1974

## (undated) DEVICE — GLOVE PROTEXIS W/NEU-THERA 8.5  2D73TE85

## (undated) DEVICE — DRAPE MICROSCOPE OPMI ZEISS 48X118" 306071-0000-000

## (undated) DEVICE — RX SURGIFLO HEMOSTATIC MATRIX 8ML 2991

## (undated) DEVICE — DRAIN JACKSON PRATT RESERVOIR 100ML SU130-1305

## (undated) DEVICE — CATH TRAY FOLEY SURESTEP 16FR DRAIN BAG STATOCK A899916

## (undated) DEVICE — SUCTION MANIFOLD NEPTUNE 2 SYS 4 PORT 0702-020-000

## (undated) DEVICE — GOWN REINFORCED XXLG 9071

## (undated) DEVICE — IMM COLLAR CERVICAL MED UNIVERSAL 3X24" 79-83500

## (undated) RX ORDER — PROPOFOL 10 MG/ML
INJECTION, EMULSION INTRAVENOUS
Status: DISPENSED
Start: 2019-01-04

## (undated) RX ORDER — LIDOCAINE HYDROCHLORIDE 10 MG/ML
INJECTION, SOLUTION EPIDURAL; INFILTRATION; INTRACAUDAL; PERINEURAL
Status: DISPENSED
Start: 2019-01-04

## (undated) RX ORDER — EPHEDRINE SULFATE 50 MG/ML
INJECTION, SOLUTION INTRAMUSCULAR; INTRAVENOUS; SUBCUTANEOUS
Status: DISPENSED
Start: 2019-01-04

## (undated) RX ORDER — KETAMINE HCL IN 0.9 % NACL 50 MG/5 ML
SYRINGE (ML) INTRAVENOUS
Status: DISPENSED
Start: 2019-01-04

## (undated) RX ORDER — FENTANYL CITRATE 50 UG/ML
INJECTION, SOLUTION INTRAMUSCULAR; INTRAVENOUS
Status: DISPENSED
Start: 2019-01-04

## (undated) RX ORDER — BUPIVACAINE HYDROCHLORIDE AND EPINEPHRINE 5; 5 MG/ML; UG/ML
INJECTION, SOLUTION EPIDURAL; INTRACAUDAL; PERINEURAL
Status: DISPENSED
Start: 2019-01-04

## (undated) RX ORDER — CEFAZOLIN SODIUM 2 G/100ML
INJECTION, SOLUTION INTRAVENOUS
Status: DISPENSED
Start: 2019-01-04

## (undated) RX ORDER — HYDROMORPHONE HYDROCHLORIDE 1 MG/ML
INJECTION, SOLUTION INTRAMUSCULAR; INTRAVENOUS; SUBCUTANEOUS
Status: DISPENSED
Start: 2019-01-04

## (undated) RX ORDER — ONDANSETRON 2 MG/ML
INJECTION INTRAMUSCULAR; INTRAVENOUS
Status: DISPENSED
Start: 2019-01-04

## (undated) RX ORDER — DEXAMETHASONE SODIUM PHOSPHATE 4 MG/ML
INJECTION, SOLUTION INTRA-ARTICULAR; INTRALESIONAL; INTRAMUSCULAR; INTRAVENOUS; SOFT TISSUE
Status: DISPENSED
Start: 2019-01-04